# Patient Record
Sex: FEMALE | Race: BLACK OR AFRICAN AMERICAN | NOT HISPANIC OR LATINO | Employment: FULL TIME | URBAN - METROPOLITAN AREA
[De-identification: names, ages, dates, MRNs, and addresses within clinical notes are randomized per-mention and may not be internally consistent; named-entity substitution may affect disease eponyms.]

---

## 2017-05-20 ENCOUNTER — HOSPITAL ENCOUNTER (EMERGENCY)
Facility: HOSPITAL | Age: 22
Discharge: HOME | End: 2017-05-20
Attending: EMERGENCY MEDICINE
Payer: COMMERCIAL

## 2017-05-20 VITALS
HEART RATE: 93 BPM | RESPIRATION RATE: 25 BRPM | HEIGHT: 64 IN | BODY MASS INDEX: 40.97 KG/M2 | TEMPERATURE: 100 F | WEIGHT: 240 LBS | DIASTOLIC BLOOD PRESSURE: 75 MMHG | SYSTOLIC BLOOD PRESSURE: 145 MMHG | OXYGEN SATURATION: 100 %

## 2017-05-20 DIAGNOSIS — K52.9 GASTROENTERITIS: Primary | ICD-10-CM

## 2017-05-20 LAB
ALBUMIN SERPL BCP-MCNC: 4 G/DL
ALP SERPL-CCNC: 69 U/L
ALT SERPL W/O P-5'-P-CCNC: 11 U/L
ANION GAP SERPL CALC-SCNC: 11 MMOL/L
AST SERPL-CCNC: 14 U/L
BACTERIA #/AREA URNS HPF: NORMAL /HPF
BASOPHILS # BLD AUTO: 0.01 K/UL
BASOPHILS NFR BLD: 0.1 %
BILIRUB SERPL-MCNC: 1.9 MG/DL
BILIRUB UR QL STRIP: NEGATIVE
BUN SERPL-MCNC: 10 MG/DL
CALCIUM SERPL-MCNC: 9.6 MG/DL
CHLORIDE SERPL-SCNC: 109 MMOL/L
CLARITY UR: CLEAR
CO2 SERPL-SCNC: 19 MMOL/L
COLOR UR: YELLOW
CREAT SERPL-MCNC: 0.9 MG/DL
DIFFERENTIAL METHOD: ABNORMAL
EOSINOPHIL # BLD AUTO: 0 K/UL
EOSINOPHIL NFR BLD: 0.2 %
ERYTHROCYTE [DISTWIDTH] IN BLOOD BY AUTOMATED COUNT: 14.5 %
EST. GFR  (AFRICAN AMERICAN): >60 ML/MIN/1.73 M^2
EST. GFR  (NON AFRICAN AMERICAN): >60 ML/MIN/1.73 M^2
GLUCOSE SERPL-MCNC: 110 MG/DL
GLUCOSE UR QL STRIP: NEGATIVE
HCT VFR BLD AUTO: 41.5 %
HGB BLD-MCNC: 15.3 G/DL
HGB UR QL STRIP: ABNORMAL
KETONES UR QL STRIP: NEGATIVE
LEUKOCYTE ESTERASE UR QL STRIP: ABNORMAL
LYMPHOCYTES # BLD AUTO: 1 K/UL
LYMPHOCYTES NFR BLD: 8.4 %
MCH RBC QN AUTO: 29.3 PG
MCHC RBC AUTO-ENTMCNC: 36.9 %
MCV RBC AUTO: 79 FL
MICROSCOPIC COMMENT: NORMAL
MONOCYTES # BLD AUTO: 0.7 K/UL
MONOCYTES NFR BLD: 5.5 %
NEUTROPHILS # BLD AUTO: 10.4 K/UL
NEUTROPHILS NFR BLD: 86.2 %
NITRITE UR QL STRIP: NEGATIVE
PH UR STRIP: 6 [PH] (ref 5–8)
PLATELET # BLD AUTO: 230 K/UL
PMV BLD AUTO: 10.4 FL
POTASSIUM SERPL-SCNC: 3.9 MMOL/L
PROT SERPL-MCNC: 8.2 G/DL
PROT UR QL STRIP: NEGATIVE
RBC # BLD AUTO: 5.23 M/UL
RBC #/AREA URNS HPF: 3 /HPF (ref 0–4)
SODIUM SERPL-SCNC: 139 MMOL/L
SP GR UR STRIP: 1.01 (ref 1–1.03)
SQUAMOUS #/AREA URNS HPF: 2 /HPF
URN SPEC COLLECT METH UR: ABNORMAL
UROBILINOGEN UR STRIP-ACNC: ABNORMAL EU/DL
WBC # BLD AUTO: 12.12 K/UL
WBC #/AREA URNS HPF: 5 /HPF (ref 0–5)

## 2017-05-20 PROCEDURE — 25000003 PHARM REV CODE 250: Performed by: EMERGENCY MEDICINE

## 2017-05-20 PROCEDURE — 96361 HYDRATE IV INFUSION ADD-ON: CPT

## 2017-05-20 PROCEDURE — 80053 COMPREHEN METABOLIC PANEL: CPT

## 2017-05-20 PROCEDURE — 96374 THER/PROPH/DIAG INJ IV PUSH: CPT

## 2017-05-20 PROCEDURE — 85025 COMPLETE CBC W/AUTO DIFF WBC: CPT

## 2017-05-20 PROCEDURE — 99284 EMERGENCY DEPT VISIT MOD MDM: CPT | Mod: 25

## 2017-05-20 PROCEDURE — 63600175 PHARM REV CODE 636 W HCPCS: Performed by: EMERGENCY MEDICINE

## 2017-05-20 PROCEDURE — 81000 URINALYSIS NONAUTO W/SCOPE: CPT

## 2017-05-20 RX ORDER — ONDANSETRON 4 MG/1
4 TABLET, FILM COATED ORAL 3 TIMES DAILY PRN
Qty: 20 TABLET | Refills: 0 | Status: SHIPPED | OUTPATIENT
Start: 2017-05-20

## 2017-05-20 RX ORDER — PROMETHAZINE HYDROCHLORIDE 25 MG/1
25 TABLET ORAL EVERY 6 HOURS PRN
Qty: 20 TABLET | Refills: 0 | Status: SHIPPED | OUTPATIENT
Start: 2017-05-20

## 2017-05-20 RX ORDER — SODIUM CHLORIDE 9 MG/ML
125 INJECTION, SOLUTION INTRAVENOUS
Status: COMPLETED | OUTPATIENT
Start: 2017-05-20 | End: 2017-05-20

## 2017-05-20 RX ORDER — ONDANSETRON 2 MG/ML
4 INJECTION INTRAMUSCULAR; INTRAVENOUS
Status: COMPLETED | OUTPATIENT
Start: 2017-05-20 | End: 2017-05-20

## 2017-05-20 RX ADMIN — ONDANSETRON 4 MG: 2 INJECTION INTRAMUSCULAR; INTRAVENOUS at 06:05

## 2017-05-20 RX ADMIN — SODIUM CHLORIDE 1000 ML: 0.9 INJECTION, SOLUTION INTRAVENOUS at 06:05

## 2017-05-20 RX ADMIN — SODIUM CHLORIDE 125 ML: 0.9 INJECTION, SOLUTION INTRAVENOUS at 06:05

## 2017-05-20 NOTE — DISCHARGE INSTRUCTIONS

## 2017-05-20 NOTE — ED AVS SNAPSHOT
[facility] [address] [place] [address]               Jina Mills   2017  5:53 AM   ED    Description:  Female : 1995   Department:  [facility]           Your Care was Coordinated By:     Provider Role From To    Jack Yates MD Attending Provider 17 0601 --      Reason for Visit     Diarrhea     Nausea     Vomiting           Diagnoses this Visit        Comments    Gastroenteritis    -  Primary       ED Disposition     ED Disposition Condition Comment    Discharge             To Do List           Follow-up Information     Follow up with Primary Doctor No In 1 week(s).        Follow up with [facility].    Specialty:  Emergency Medicine    Why:  If symptoms worsen, Or worsening condition or any other major concern    Contact information:    [address] [place] [address] [phone]       These Medications        Disp Refills Start End    ondansetron (ZOFRAN) 4 MG tablet 20 tablet 0 2017     Take 1 tablet (4 mg total) by mouth 3 (three) times daily as needed for Nausea. - Oral    Pharmacy: [facility] [address] Ph #: [phone]       promethazine (PHENERGAN) 25 MG tablet 20 tablet 0 2017     Take 1 tablet (25 mg total) by mouth every 6 (six) hours as needed for Nausea. - Oral    Pharmacy: [facility] [address] Ph #: [phone]         [facility]     [facility] Care [facility] - 24/ Assistance  Unless otherwise directed by your provider, please contact [facility], our nurse care line that is available for 24/7 assistance.     Registered nurses in the [facility] provide: appointment scheduling, clinical advisement, health education, and other advisory services.  Call: [phone] (toll  free)               Medications           Message regarding Medications     Verify the changes and/or additions to your medication regime listed below are the same as discussed with your clinician today.  If any of these changes or additions are incorrect, please notify your healthcare provider.        START taking these NEW medications        Refills    ondansetron (ZOFRAN) 4 MG tablet 0    Sig: Take 1 tablet (4 mg total) by mouth 3 (three) times daily as needed for Nausea.    Class: Print    Route: Oral    promethazine (PHENERGAN) 25 MG tablet 0    Sig: Take 1 tablet (25 mg total) by mouth every 6 (six) hours as needed for Nausea.    Class: Print    Route: Oral      These medications were administered today        Dose Freq    0.9%  NaCl infusion 125 mL ED 1 Time    Sig: Inject 125 mLs into the vein ED 1 Time.    Class: Normal    Route: Intravenous    sodium chloride 0.9% bolus 1,000 mL 1,000 mL ED 1 Time    Sig: Inject 1,000 mLs into the vein ED 1 Time.    Class: Normal    Route: Intravenous    ondansetron injection 4 mg 4 mg ED 1 Time    Sig: Inject 4 mg into the vein ED 1 Time.    Class: Normal    Route: Intravenous    sodium chloride 0.9% bolus 1,000 mL 1,000 mL ED 1 Time    Sig: Inject 1,000 mLs into the vein ED 1 Time.    Class: Normal    Route: Intravenous           Verify that the below list of medications is an accurate representation of the medications you are currently taking.  If none reported, the list may be blank. If incorrect, please contact your healthcare provider. Carry this list with you in case of emergency.           Current Medications     ondansetron (ZOFRAN) 4 MG tablet Take 1 tablet (4 mg total) by mouth 3 (three) times daily as needed for Nausea.    promethazine (PHENERGAN) 25 MG tablet Take 1 tablet (25 mg total) by mouth every 6 (six) hours as needed for Nausea.    sodium chloride 0.9% bolus 1,000 mL Inject 1,000 mLs into the vein ED 1 Time.           Clinical Reference Information     "       Your Vitals Were     BP Pulse Temp Resp Height Weight    121/47 95 100.1 °F (37.8 °C) (Oral) 21 5' 4" (1.626 m) 108.9 kg (240 lb)    SpO2 BMI             100% 41.2 kg/m2         Allergies as of 5/20/2017        Reactions    Iodine And Iodide Containing Products Anaphylaxis      Immunizations Administered on Date of Encounter - 5/20/2017     None      ED Micro, Lab, POCT     Start Ordered       Status Ordering Provider    05/20/17 0609 05/20/17 0609  CBC auto differential  Once      Final result     05/20/17 0609 05/20/17 0609  Comprehensive metabolic panel  STAT      Final result     05/20/17 0609 05/20/17 0609  Urinalysis - Clean Catch  Once      Final result     05/20/17 0609 05/20/17 0609  Urinalysis Microscopic  Once      Final result       ED Imaging Orders     None        Discharge Instructions           Viral Gastroenteritis (Adult)    Gastroenteritis is commonly called the stomach flu. It is most often caused by a virus that affects the stomach and intestinal tract and usually lasts from 2 to 7 days. Common viruses causing gastroenteritis include norovirus, rotavirus, and hepatitis A. Non-viral causes of gastroenteritis include bacteria, parasites, and toxins.  The danger from repeated vomiting or diarrhea is dehydration. This is the loss of too much fluid from the body. When this occurs, body fluids must be replaced. Antibiotics do not help with this illness because it is usually viral.Simple home treatment will be helpful.  Symptoms of viral gastroenteritis may include:  · Watery, loose stools  · Stomach pain or abdominal cramps  · Fever and chills  · Nausea and vomiting  · Loss of bowel control  · Headache  Home care  Gastroenteritis is transmitted by contact with the stool or vomit of an infected person. This can occur from person to person or from contact with a contaminated surface.  Follow these guidelines when caring for yourself at home:  · If symptoms are severe, rest at home for the next 24 " hours or until you are feeling better.  · Wash your hands with soap and water or use alcohol-based  to prevent the spread of infection. Wash your hands after touching anyone who is sick.  · Wash your hands or use alcohol-based  after using the toilet and before meals. Clean the toilet after each use.  Remember these tips when preparing food:  · People with diarrhea should not prepare or serve food to others. When preparing foods, wash your hands before and after.  · Wash your hands after using cutting boards, countertops, knives, or utensils that have been in contact with raw food.  · Keep uncooked meats away from cooked and ready-to-eat foods.  Medicine  You may use acetaminophen or NSAID medicines like ibuprofen or naproxen to control fever unless another medicine was given. If you have chronic liver or kidney disease, talk with your healthcare provider before using these medicines. Also talk with your provider if you've had a stomach ulcer or gastrointestinal bleeding. Don't give aspirin to anyone under 18 years of age who is ill with a fever. It may cause severe liver damage. Don't use NSAIDS is you are already taking one for another condition (like arthritis) or are on aspirin (such as for heart disease or after a stroke).  If medicine for vomiting or diarrhea are prescribed, take these only as directed. Do not take over-the-counter medicines for vomiting or diarrhea unless instructed by your healthcare provider.  Diet  Follow these guidelines for food:  · Water and liquids are important so you don't get dehydrated. Drink a small amount at a time or suck on ice chips if you are vomiting.  · If you eat, avoid fatty, greasy, spicy, or fried foods.  · Don't eat dairy if you have diarrhea. This can make diarrhea worse.  · Avoid tobacco, alcohol, and caffeine which may worsen symptoms.  During the first 24 hours (the first full day), follow the diet below:  · Beverages. Sports drinks, soft drinks  without caffeine, ginger ale, mineral water (plain or flavored), decaffeinated tea and coffee. If you are very dehydrated, sports drinks aren't a good choice. They have too much sugar and not enough electrolytes. In this case, commercially available products called oral rehydration solutions, are best.  · Soups. Eat clear broth, consommé, and bouillon.  · Desserts. Eat gelatin, popsicles, and fruit juice bars.  During the next 24 hours (the second day), you may add the following to the above:  · Hot cereal, plain toast, bread, rolls, and crackers  · Plain noodles, rice, mashed potatoes, chicken noodle or rice soup  · Unsweetened canned fruit (avoid pineapple), bananas  · Limit fat intake to less than 15 grams per day. Do this by avoiding margarine, butter, oils, mayonnaise, sauces, gravies, fried foods, peanut butter, meat, poultry, and fish.  · Limit fiber and avoid raw or cooked vegetables, fresh fruits (except bananas), and bran cereals.  · Limit caffeine and chocolate. Don't use spices or seasonings other than salt.  · Limit dairy products.  · Avoid alcohol.  During the next 24 hours:  · Gradually resume a normal diet as you feel better and your symptoms improve.  · If at any time it starts getting worse again, go back to clear liquids until you feel better.  Follow-up care  Follow up with your healthcare provider, or as advised. Call your provider if you don't get better within 24 hours or if diarrhea lasts more than a week. Also follow up if you are unable to keep down liquids and get dehydrated. If a stool (diarrhea) sample was taken, call as directed for the results.  Call 911  Call 911 if any of these occur:  · Trouble breathing  · Chest pain  · Confused  · Severe drowsiness or trouble awakening  · Fainting or loss of consciousness  · Rapid heart rate  · Seizure  · Stiff neck  When to seek medical advice  Call your healthcare provider right away if any of these occur:  · Abdominal pain that gets  worse  · Continued vomiting (unable to keep liquids down)  · Frequent diarrhea (more than 5 times a day)  · Blood in vomit or stool (black or red color)  · Dark urine, reduced urine output, or extreme thirst  · Weakness or dizziness  · Drowsiness  · Fever of 100.4°F (38°C) oral or higher that does not get better with fever medicine  · New rash  Date Last Reviewed: 1/3/2016  © 2520-9164 [employer] [address] [place] [address], [place] [address]. All rights reserved. This information is not intended as a substitute for professional medical care. Always follow your healthcare professional's instructions.          [facility] Sign-Up     Activating your [facility] account is as easy as 1-2-3!     1) Visit [email or web address], select Sign Up Now, enter this activation code and your date of birth, then select Next.  [email or web address]  Expires: 7/4/2017  8:26 AM      2) Create a username and password to use when you visit [facility] in the future and select a security question in case you lose your password and select Next.    3) Enter your e-mail address and click Sign Up!    Additional Information  If you have questions, please e-mail [email or web address] or call [phone] to talk to our [facility] staff. Remember, [facility] is NOT to be used for urgent needs. For medical emergencies, dial 911.         Smoking Cessation     If you would like to quit smoking:   You may be eligible for free services if you are a [place] resident and started smoking cigarettes before September 1, 1988.  Call the Smoking Cessation Trust (SCT) toll free at (106) 287-1292 or (544) 380-5486.   Call -800-QUIT-NOW if you do not meet the above criteria.   Contact us via email: [email or web address]   View our website for more information: [email or web address]         [facility] complies with applicable Federal civil rights laws and does not discriminate on the basis of race, color, national origin, age,  disability, or sex.        Language Assistance Services     ATTENTION: Language assistance services are available, free of charge. Please call [phone].      ATENCIÓN: Si habla eusebiaañol, tiene a donahue disposición servicios gratuitos de asistencia lingüística. Llame al [phone].     CHÚ Ý: N?u b?n nói Ti?ng Vi?t, có các d?ch v? h? tr? ngôn ng? mi?n phí dành cho b?n. G?i s? [phone].

## 2017-05-20 NOTE — ED PROVIDER NOTES
SCRIBE #1 NOTE: I, Man Jean Baptiste (Khaiden), am scribing for, and in the presence of, Jack Yates MD. I have scribed the entire note.      History      Chief Complaint   Patient presents with    Diarrhea     onset 3 days ago    Nausea    Vomiting       Review of patient's allergies indicates:   Allergen Reactions    Iodine and iodide containing products Anaphylaxis        HPI   HPI    5/20/2017, 6:07 AM   History obtained from the patient      History of Present Illness: Jina Mills is a 22 y.o. female patient who presents to the Emergency Department for abd pain which onset gradually yesterday. Symptoms are episodic and moderate in severity. No mitigating or exacerbating factors reported. Associated sxs include n/v/d, fever, chills, generalized weakness. Patient denies any hematemesis, hematochezia, dysuria, urinary frequency, constipation, and all other sxs at this time. No prior Tx reported. No further complaints or concerns at this time.         Arrival mode: Personal    PCP: Primary Doctor No       Past Medical History:  Past Medical Hx Reviewed not pertinent.     Past Surgical History:  Past Surgical Hx Reviewed not pertinent.    Family History:  Family History   Problem Relation Age of Onset    No Known Problems Mother     No Known Problems Father     No Known Problems Sister     No Known Problems Brother     No Known Problems Maternal Aunt     No Known Problems Maternal Uncle     No Known Problems Paternal Aunt     No Known Problems Paternal Uncle     No Known Problems Maternal Grandmother     No Known Problems Maternal Grandfather     No Known Problems Paternal Grandmother     No Known Problems Paternal Grandfather     Amblyopia Neg Hx     Blindness Neg Hx     Cancer Neg Hx     Cataracts Neg Hx     Diabetes Neg Hx     Glaucoma Neg Hx     Hypertension Neg Hx     Macular degeneration Neg Hx     Retinal detachment Neg Hx     Strabismus Neg Hx     Stroke Neg Hx      Thyroid disease Neg Hx        Social History:  Social History     Social History Main Topics    Smoking status: Current Every Day Smoker     Types: Cigarettes    Smokeless tobacco: Unknown    Alcohol use No    Drug use: No    Sexual activity: Unknown       ROS   Review of Systems   Constitutional: Positive for chills and fever (Subjective).        (+) Generalized weakness   HENT: Negative for sore throat.    Respiratory: Negative for shortness of breath.    Cardiovascular: Negative for chest pain.   Gastrointestinal: Positive for abdominal pain, diarrhea (x2) and nausea. Negative for blood in stool. Vomiting: x4.        (-) hematemesis   Genitourinary: Negative for dysuria, vaginal bleeding, vaginal discharge and vaginal pain.   Musculoskeletal: Negative for back pain.   Skin: Negative for rash.   Neurological: Negative for weakness.   Hematological: Does not bruise/bleed easily.   All other systems reviewed and are negative.      Physical Exam    Initial Vitals   BP Pulse Resp Temp SpO2   05/20/17 0551 05/20/17 0551 05/20/17 0551 05/20/17 0551 05/20/17 0551   148/82 100 16 100.1 °F (37.8 °C) 96 %      Physical Exam  Nursing Notes and Vital Signs Reviewed.  Constitutional: Patient is in no acute distress. Well-developed and well-nourished.  Head: Atraumatic. Normocephalic.  Eyes: PERRL. EOM intact. Conjunctivae are not pale. No scleral icterus.  ENT: Mucous membranes are moist. Oropharynx is clear and symmetric.    Neck: Supple. Full ROM. No lymphadenopathy.  Cardiovascular: Regular rate. Regular rhythm. No murmurs, rubs, or gallops. Distal pulses are 2+ and symmetric.  Pulmonary/Chest: No respiratory distress. Clear to auscultation bilaterally. No wheezing, rales, or rhonchi.  Abdominal: Soft and non-distended.  There is no tenderness.  No rebound, guarding, or rigidity. Good bowel sounds.  Musculoskeletal: Moves all extremities. No obvious deformities. No edema. No calf tenderness.  Skin: Warm and  "dry.  Neurological:  Alert, awake, and appropriate.  Normal speech.  No acute focal neurological deficits are appreciated.  Psychiatric: Normal affect. Good eye contact. Appropriate in content.    ED Course    Procedures  ED Vital Signs:  Vitals:    05/20/17 0551 05/20/17 0650 05/20/17 0716 05/20/17 0842   BP: (!) 148/82 (!) 152/70 (!) 121/47    Pulse: 100 96 95 93   Resp: 16 (!) 23 (!) 21 (!) 25   Temp: 100.1 °F (37.8 °C)      TempSrc: Oral      SpO2: 96% 100% 100% 100%   Weight: 108.9 kg (240 lb)      Height: 5' 4" (1.626 m)       05/20/17 0853   BP: (!) 145/75   Pulse:    Resp:    Temp:    TempSrc:    SpO2:    Weight:    Height:        Abnormal Lab Results:  Labs Reviewed   CBC W/ AUTO DIFFERENTIAL - Abnormal; Notable for the following:        Result Value    MCV 79 (*)     MCHC 36.9 (*)     Gran # 10.4 (*)     Gran% 86.2 (*)     Lymph% 8.4 (*)     All other components within normal limits   COMPREHENSIVE METABOLIC PANEL - Abnormal; Notable for the following:     CO2 19 (*)     Total Bilirubin 1.9 (*)     All other components within normal limits   URINALYSIS - Abnormal; Notable for the following:     Occult Blood UA Trace (*)     Urobilinogen, UA 2.0-3.0 (*)     Leukocytes, UA 2+ (*)     All other components within normal limits   URINALYSIS MICROSCOPIC        All Lab Results:  Results for orders placed or performed during the hospital encounter of 05/20/17   CBC auto differential   Result Value Ref Range    WBC 12.12 3.90 - 12.70 K/uL    RBC 5.23 4.00 - 5.40 M/uL    Hemoglobin 15.3 12.0 - 16.0 g/dL    Hematocrit 41.5 37.0 - 48.5 %    MCV 79 (L) 82 - 98 fL    MCH 29.3 27.0 - 31.0 pg    MCHC 36.9 (H) 32.0 - 36.0 %    RDW 14.5 11.5 - 14.5 %    Platelets 230 150 - 350 K/uL    MPV 10.4 9.2 - 12.9 fL    Gran # 10.4 (H) 1.8 - 7.7 K/uL    Lymph # 1.0 1.0 - 4.8 K/uL    Mono # 0.7 0.3 - 1.0 K/uL    Eos # 0.0 0.0 - 0.5 K/uL    Baso # 0.01 0.00 - 0.20 K/uL    Gran% 86.2 (H) 38.0 - 73.0 %    Lymph% 8.4 (L) 18.0 - 48.0 %    " Mono% 5.5 4.0 - 15.0 %    Eosinophil% 0.2 0.0 - 8.0 %    Basophil% 0.1 0.0 - 1.9 %    Differential Method Automated    Comprehensive metabolic panel   Result Value Ref Range    Sodium 139 136 - 145 mmol/L    Potassium 3.9 3.5 - 5.1 mmol/L    Chloride 109 95 - 110 mmol/L    CO2 19 (L) 23 - 29 mmol/L    Glucose 110 70 - 110 mg/dL    BUN, Bld 10 6 - 20 mg/dL    Creatinine 0.9 0.5 - 1.4 mg/dL    Calcium 9.6 8.7 - 10.5 mg/dL    Total Protein 8.2 6.0 - 8.4 g/dL    Albumin 4.0 3.5 - 5.2 g/dL    Total Bilirubin 1.9 (H) 0.1 - 1.0 mg/dL    Alkaline Phosphatase 69 55 - 135 U/L    AST 14 10 - 40 U/L    ALT 11 10 - 44 U/L    Anion Gap 11 8 - 16 mmol/L    eGFR if African American >60 >60 mL/min/1.73 m^2    eGFR if non African American >60 >60 mL/min/1.73 m^2   Urinalysis - Clean Catch   Result Value Ref Range    Specimen UA Urine, Clean Catch     Color, UA Yellow Yellow, Straw, Aspen    Appearance, UA Clear Clear    pH, UA 6.0 5.0 - 8.0    Specific Gravity, UA 1.015 1.005 - 1.030    Protein, UA Negative Negative    Glucose, UA Negative Negative    Ketones, UA Negative Negative    Bilirubin (UA) Negative Negative    Occult Blood UA Trace (A) Negative    Nitrite, UA Negative Negative    Urobilinogen, UA 2.0-3.0 (A) <2.0 EU/dL    Leukocytes, UA 2+ (A) Negative   Urinalysis Microscopic   Result Value Ref Range    RBC, UA 3 0 - 4 /hpf    WBC, UA 5 0 - 5 /hpf    Bacteria, UA Occasional None-Occ /hpf    Squam Epithel, UA 2 /hpf    Microscopic Comment SEE COMMENT               The Emergency Provider reviewed the vital signs and test results, which are outlined above.    ED Discussion     7:22 AM: Re-evaluated pt. Pt is resting comfortably and is in no acute distress. D/w pt all pertinent results. D/w pt any concerns expressed at this time. Answered all questions. Pt expresses understanding at this time.    8:26 AM: Reassessed pt at this time.  Pt states her condition has improved at this time. Discussed with pt all pertinent ED  information and results. Discussed pt dx of  and plan of tx. Gave pt all f/u and return to the ED instructions. All questions and concerns were addressed at this time. Pt expresses understanding of information and instructions, and is comfortable with plan to discharge. Pt is stable for discharge.      ED Medication(s):  Medications   0.9%  NaCl infusion (0 mLs Intravenous Stopped 5/20/17 0649)   sodium chloride 0.9% bolus 1,000 mL (0 mLs Intravenous Stopped 5/20/17 0851)   ondansetron injection 4 mg (4 mg Intravenous Given 5/20/17 0625)       Discharge Medication List as of 5/20/2017  8:27 AM      START taking these medications    Details   ondansetron (ZOFRAN) 4 MG tablet Take 1 tablet (4 mg total) by mouth 3 (three) times daily as needed for Nausea., Starting 5/20/2017, Until Discontinued, Print      promethazine (PHENERGAN) 25 MG tablet Take 1 tablet (25 mg total) by mouth every 6 (six) hours as needed for Nausea., Starting 5/20/2017, Until Discontinued, Print             Follow-up Information     Primary Doctor No In 1 week(s).           [facility].    Specialty:  Emergency Medicine  Why:  If symptoms worsen, Or worsening condition or any other major concern  Contact information:  [address] [facility] [address] [phone]                   Medical Decision Making    Medical Decision Making:   Clinical Tests:   Lab Tests: Ordered and Reviewed           Scribe Attestation:   Scribe #1: I performed the above scribed service and the documentation accurately describes the services I performed. I attest to the accuracy of the note.    Attending:   Physician Attestation Statement for Scribe #1: I, Jack Yates MD, personally performed the services described in this documentation, as scribed by Man Jean Baptiste (Khaiden), in my presence, and it is both accurate and complete.          Clinical Impression       ICD-10-CM ICD-9-CM   1. Gastroenteritis K52.9 558.9        Disposition:   Disposition: Discharged  Condition: Stable         Jack Yates MD  05/26/17 2032

## 2019-04-22 ENCOUNTER — HOSPITAL ENCOUNTER (EMERGENCY)
Facility: HOSPITAL | Age: 24
Discharge: HOME | End: 2019-04-23
Attending: EMERGENCY MEDICINE
Payer: MEDICAID

## 2019-04-22 DIAGNOSIS — N83.202 CYSTS OF BOTH OVARIES: ICD-10-CM

## 2019-04-22 DIAGNOSIS — R10.31 ACUTE RIGHT LOWER QUADRANT PAIN: Primary | ICD-10-CM

## 2019-04-22 DIAGNOSIS — N83.201 CYSTS OF BOTH OVARIES: ICD-10-CM

## 2019-04-22 DIAGNOSIS — N83.8 ENLARGED OVARY: ICD-10-CM

## 2019-04-22 LAB
ALBUMIN SERPL BCP-MCNC: 4 G/DL (ref 3.5–5.2)
ALP SERPL-CCNC: 68 U/L (ref 55–135)
ALT SERPL W/O P-5'-P-CCNC: 14 U/L (ref 10–44)
ANION GAP SERPL CALC-SCNC: 8 MMOL/L (ref 8–16)
AST SERPL-CCNC: 15 U/L (ref 10–40)
B-HCG UR QL: NEGATIVE
BACTERIA #/AREA URNS HPF: ABNORMAL /HPF
BASOPHILS # BLD AUTO: 0.02 K/UL (ref 0–0.2)
BASOPHILS NFR BLD: 0.2 % (ref 0–1.9)
BILIRUB SERPL-MCNC: 0.7 MG/DL (ref 0.1–1)
BILIRUB UR QL STRIP: NEGATIVE
BUN SERPL-MCNC: 10 MG/DL (ref 6–20)
CALCIUM SERPL-MCNC: 9.6 MG/DL (ref 8.7–10.5)
CHLORIDE SERPL-SCNC: 107 MMOL/L (ref 95–110)
CLARITY UR: CLEAR
CO2 SERPL-SCNC: 22 MMOL/L (ref 23–29)
COLOR UR: YELLOW
CREAT SERPL-MCNC: 0.9 MG/DL (ref 0.5–1.4)
DIFFERENTIAL METHOD: ABNORMAL
EOSINOPHIL # BLD AUTO: 0.1 K/UL (ref 0–0.5)
EOSINOPHIL NFR BLD: 0.5 % (ref 0–8)
ERYTHROCYTE [DISTWIDTH] IN BLOOD BY AUTOMATED COUNT: 14.7 % (ref 11.5–14.5)
EST. GFR  (AFRICAN AMERICAN): >60 ML/MIN/1.73 M^2
EST. GFR  (NON AFRICAN AMERICAN): >60 ML/MIN/1.73 M^2
GLUCOSE SERPL-MCNC: 95 MG/DL (ref 70–110)
GLUCOSE UR QL STRIP: NEGATIVE
HCT VFR BLD AUTO: 39 % (ref 37–48.5)
HGB BLD-MCNC: 14.1 G/DL (ref 12–16)
HGB UR QL STRIP: NEGATIVE
KETONES UR QL STRIP: NEGATIVE
LEUKOCYTE ESTERASE UR QL STRIP: ABNORMAL
LIPASE SERPL-CCNC: 25 U/L (ref 4–60)
LYMPHOCYTES # BLD AUTO: 2.8 K/UL (ref 1–4.8)
LYMPHOCYTES NFR BLD: 24.6 % (ref 18–48)
MCH RBC QN AUTO: 29.6 PG (ref 27–31)
MCHC RBC AUTO-ENTMCNC: 36.2 G/DL (ref 32–36)
MCV RBC AUTO: 82 FL (ref 82–98)
MICROSCOPIC COMMENT: ABNORMAL
MONOCYTES # BLD AUTO: 0.4 K/UL (ref 0.3–1)
MONOCYTES NFR BLD: 3.4 % (ref 4–15)
NEUTROPHILS # BLD AUTO: 8 K/UL (ref 1.8–7.7)
NEUTROPHILS NFR BLD: 71.3 % (ref 38–73)
NITRITE UR QL STRIP: NEGATIVE
PH UR STRIP: 7 [PH] (ref 5–8)
PLATELET # BLD AUTO: 288 K/UL (ref 150–350)
PMV BLD AUTO: 9.8 FL (ref 9.2–12.9)
POTASSIUM SERPL-SCNC: 4.1 MMOL/L (ref 3.5–5.1)
PROT SERPL-MCNC: 7.7 G/DL (ref 6–8.4)
PROT UR QL STRIP: NEGATIVE
RBC # BLD AUTO: 4.77 M/UL (ref 4–5.4)
RBC #/AREA URNS HPF: 5 /HPF (ref 0–4)
SODIUM SERPL-SCNC: 137 MMOL/L (ref 136–145)
SP GR UR STRIP: 1.02 (ref 1–1.03)
URN SPEC COLLECT METH UR: ABNORMAL
UROBILINOGEN UR STRIP-ACNC: NEGATIVE EU/DL
WBC # BLD AUTO: 11.22 K/UL (ref 3.9–12.7)
WBC #/AREA URNS HPF: 8 /HPF (ref 0–5)

## 2019-04-22 PROCEDURE — 81025 URINE PREGNANCY TEST: CPT

## 2019-04-22 PROCEDURE — 81000 URINALYSIS NONAUTO W/SCOPE: CPT

## 2019-04-22 PROCEDURE — 51702 INSERT TEMP BLADDER CATH: CPT

## 2019-04-22 PROCEDURE — 25000003 PHARM REV CODE 250: Performed by: NURSE PRACTITIONER

## 2019-04-22 PROCEDURE — 99284 EMERGENCY DEPT VISIT MOD MDM: CPT

## 2019-04-22 PROCEDURE — 80053 COMPREHEN METABOLIC PANEL: CPT

## 2019-04-22 PROCEDURE — 83690 ASSAY OF LIPASE: CPT

## 2019-04-22 PROCEDURE — 85025 COMPLETE CBC W/AUTO DIFF WBC: CPT

## 2019-04-22 RX ORDER — ONDANSETRON 8 MG/1
8 TABLET, ORALLY DISINTEGRATING ORAL
Status: COMPLETED | OUTPATIENT
Start: 2019-04-22 | End: 2019-04-22

## 2019-04-22 RX ORDER — HYDROCODONE BITARTRATE AND ACETAMINOPHEN 5; 325 MG/1; MG/1
1 TABLET ORAL EVERY 8 HOURS PRN
Qty: 12 TABLET | Refills: 0 | Status: SHIPPED | OUTPATIENT
Start: 2019-04-22

## 2019-04-22 RX ORDER — ONDANSETRON 4 MG/1
4 TABLET, ORALLY DISINTEGRATING ORAL EVERY 8 HOURS PRN
Qty: 15 TABLET | Refills: 0 | Status: SHIPPED | OUTPATIENT
Start: 2019-04-22

## 2019-04-22 RX ORDER — HYDROCODONE BITARTRATE AND ACETAMINOPHEN 10; 325 MG/1; MG/1
1 TABLET ORAL
Status: COMPLETED | OUTPATIENT
Start: 2019-04-22 | End: 2019-04-22

## 2019-04-22 RX ADMIN — HYDROCODONE BITARTRATE AND ACETAMINOPHEN 1 TABLET: 10; 325 TABLET ORAL at 11:04

## 2019-04-22 RX ADMIN — ONDANSETRON 8 MG: 8 TABLET, ORALLY DISINTEGRATING ORAL at 08:04

## 2019-04-23 VITALS
RESPIRATION RATE: 18 BRPM | HEIGHT: 64 IN | DIASTOLIC BLOOD PRESSURE: 105 MMHG | BODY MASS INDEX: 40.83 KG/M2 | HEART RATE: 69 BPM | TEMPERATURE: 98 F | SYSTOLIC BLOOD PRESSURE: 152 MMHG | WEIGHT: 239.19 LBS | OXYGEN SATURATION: 97 %

## 2019-04-23 NOTE — ED NOTES
Bed: 21  Expected date:   Expected time:   Means of arrival:   Comments:  Mills In Treatment desean Garrett NP  04/22/19 0962

## 2019-04-23 NOTE — ED PROVIDER NOTES
SCRIBE #1 NOTE: I, Josselyn Chisholm, am scribing for, and in the presence of, Tyler Garrett NP. I have scribed the entire note.       History     Chief Complaint   Patient presents with    Vomiting     LMP 4/1/19, pt reports 1 day cycle. Pt states generalized abd cramping. Not tender to palpation. LBM 4/21/19     Review of patient's allergies indicates:   Allergen Reactions    Iodine and iodide containing products Anaphylaxis         History of Present Illness     HPI    4/22/2019, 8:13 PM  History obtained from the patient      History of Present Illness: Jina Mills is a 24 y.o. female patient who presents to the Emergency Department for evaluation of emesis which onset gradually x2 days ago. Pt states she cannot keep anything down, not even her medication. Symptoms are episodic and moderate in severity. No mitigating or exacerbating factors reported. Associated sxs include nausea and R sided abd pain. Patient denies any fever, chills, numbness, frequency, weakness, dysuria, hematuria, blood in stool, diaphoresis, and all other sxs at this time. Prior Tx includes tylenol and muscle relaxer with no relief. Pt notes her LNMP was 4/1/19. It was a one day cycle. No further complaints or concerns at this time.         Arrival mode: Personal vehicle     PCP: Primary Doctor No        Past Medical History:  History reviewed. No pertinent past medical history.    Past Surgical History:  History reviewed. No pertinent surgical history.      Family History:  Family History   Problem Relation Age of Onset    No Known Problems Mother     No Known Problems Father     No Known Problems Sister     No Known Problems Brother     No Known Problems Maternal Aunt     No Known Problems Maternal Uncle     No Known Problems Paternal Aunt     No Known Problems Paternal Uncle     No Known Problems Maternal Grandmother     No Known Problems Maternal Grandfather     No Known Problems Paternal Grandmother     No Known  Problems Paternal Grandfather     Amblyopia Neg Hx     Blindness Neg Hx     Cancer Neg Hx     Cataracts Neg Hx     Diabetes Neg Hx     Glaucoma Neg Hx     Hypertension Neg Hx     Macular degeneration Neg Hx     Retinal detachment Neg Hx     Strabismus Neg Hx     Stroke Neg Hx     Thyroid disease Neg Hx        Social History:  Social History     Tobacco Use    Smoking status: Current Every Day Smoker     Types: Cigarettes   Substance and Sexual Activity    Alcohol use: No    Drug use: No    Sexual activity: Not on file        Review of Systems     Review of Systems   Constitutional: Negative for chills, diaphoresis and fever.   HENT: Negative for sore throat.    Respiratory: Negative for shortness of breath.    Cardiovascular: Negative for chest pain.   Gastrointestinal: Positive for abdominal pain (R sided), nausea and vomiting. Negative for blood in stool.   Genitourinary: Negative for dysuria, frequency and hematuria.   Musculoskeletal: Negative for back pain.   Skin: Negative for rash.   Neurological: Negative for weakness and numbness.   Hematological: Does not bruise/bleed easily.   All other systems reviewed and are negative.       Physical Exam     Initial Vitals [04/22/19 1909]   BP Pulse Resp Temp SpO2   (!) 140/82 79 18 98.6 °F (37 °C) 96 %      MAP       --          Physical Exam  Nursing Notes and Vital Signs Reviewed.  Constitutional: Patient is in no acute distress. Well-developed and well-nourished.  Head: Atraumatic. Normocephalic.  Eyes: PERRL. EOM intact. Conjunctivae are not pale. No scleral icterus.  ENT: Mucous membranes are moist. Oropharynx is clear and symmetric.    Neck: Supple. Full ROM. No lymphadenopathy.  Cardiovascular: Regular rate. Regular rhythm. No murmurs, rubs, or gallops. Distal pulses are 2+ and symmetric.  Pulmonary/Chest: No respiratory distress. Clear to auscultation bilaterally. No wheezing or rales.  Abdominal:  Soft and non-distended.  There is  No  "tenderness.  No rebound, guarding, or rigidity.  No organomegaly. No pulsatile mass. Normal bowel sounds.  Genitourinary: No CVA tenderness  Musculoskeletal: Moves all extremities. No obvious deformities. No edema. No calf tenderness.  Skin: Warm and dry.  Neurological:  Alert, awake, and appropriate.  Normal speech.  No acute focal neurological deficits are appreciated.  Psychiatric: Normal affect. Good eye contact. Appropriate in content.     ED Course   Procedures  ED Vital Signs:  Vitals:    04/22/19 1909 04/23/19 0001   BP: (!) 140/82 (!) 152/105   Pulse: 79 69   Resp: 18    Temp: 98.6 °F (37 °C) 98.2 °F (36.8 °C)   TempSrc: Oral Oral   SpO2: 96% 97%   Weight: 108.5 kg (239 lb 3.2 oz)    Height: 5' 4" (1.626 m)        Abnormal Lab Results:  Labs Reviewed   URINALYSIS, REFLEX TO URINE CULTURE - Abnormal; Notable for the following components:       Result Value    Leukocytes, UA Trace (*)     All other components within normal limits    Narrative:     Preferred Collection Type->Urine, Clean Catch   CBC W/ AUTO DIFFERENTIAL - Abnormal; Notable for the following components:    MCHC 36.2 (*)     RDW 14.7 (*)     Gran # (ANC) 8.0 (*)     Mono% 3.4 (*)     All other components within normal limits   COMPREHENSIVE METABOLIC PANEL - Abnormal; Notable for the following components:    CO2 22 (*)     All other components within normal limits   URINALYSIS MICROSCOPIC - Abnormal; Notable for the following components:    RBC, UA 5 (*)     WBC, UA 8 (*)     All other components within normal limits    Narrative:     Preferred Collection Type->Urine, Clean Catch   PREGNANCY TEST, URINE RAPID   LIPASE        All Lab Results:      Imaging Results:  Imaging Results          US Pelvis Comp with Transvag NON-OB (xpd) (Final result)  Result time 04/22/19 23:18:28   Procedure changed from US Pelvis Complete Non OB     Final result by Michael Han III, MD (04/22/19 23:18:28)                 Impression:      1.  Uterus is " unremarkable.  Incidental note of nabothian cysts.    2.  Left ovary shows evidence of a 1.5 cm cyst but is otherwise unremarkable.    3.  Multicystic right ovary with multiple hemorrhagic cyst suggested, the largest measuring 4.5 cm in diameter.  The ovary is enlarged measuring up to 9 cm in length.      Electronically signed by: Michael Han MD  Date:    04/22/2019  Time:    23:18             Narrative:    EXAMINATION:  US PELVIS COMP WITH TRANSVAG NON-OB (XPD)    CLINICAL HISTORY:  right pelvic pain;    FINDINGS  Uterus measures 6.6 x 3 x 5.3 cm in diameter.  There is a multi cystic ovary on the right including in some internal echoes within the cysts.  The ovary itself is large measuring 9 x 4 x 7.3 cm in dimension.  The largest cyst measures 4.5 cm in diameter.    Doppler analysis shows flow bilaterally without evidence of torsion.  Left ovary shows evidence of a 1.5 cm cyst and measures 2.6 cm in overall diameter.                               CT Abdomen Pelvis  Without Contrast (Final result)  Result time 04/22/19 21:31:08   Procedure changed from CT Abdomen Pelvis With Contrast     Final result by Michael Han III, MD (04/22/19 21:31:08)                 Impression:      1.  No bowel obstruction or free air.    2.  No definite evidence of acute appendicitis although the scan is limited without contrast.    3.  Suspect adnexal densities primarily on the right which at least in part are cystic.  Cannot exclude hydrosalpinx as well.  If indicated, consider further correlation with ultrasound.  Repeat CT with oral and IV contrast could add additional information if indicated.    All CT scans at this facility are performed  using dose modulation techniques as appropriate to performed exam including the following:  automated exposure control; adjustment of mA and/or kV according to the patients size (this includes techniques or standardized protocols for targeted exams where dose is matched to  indication/reason for exam: i.e. extremities or head);  iterative reconstruction technique.      Electronically signed by: Michael Han MD  Date:    04/22/2019  Time:    21:31             Narrative:    EXAMINATION:  CT ABDOMEN PELVIS WITHOUT CONTRAST    CLINICAL HISTORY:  RLQ pain, appendicitis suspected;    TECHNIQUE:  Axial CT images performed through the abdomen and pelvis without intravenous contrast. Multiplanar reformats were performed and interpreted.    COMPARISON:  None    FINDINGS:  Heart size is normal.  Lung bases are grossly clear.  There is no free intraperitoneal air or bowel obstruction.  Bony windows show no acute abnormality.    The liver and spleen are unremarkable.  Gallbladder is of normal CT appearance.  There is no adrenal or pancreatic mass or enlargement.  There is no solid renal mass or obstruction.  No ascites or adenopathy.    Although the study is limited without the use of IV and oral contrast, there is no definite abnormality in the right lower quadrant that would suggest acute appendicitis.    In the pelvis however there are areas of mixed attenuation with bladder probably ovarian cysts, primarily on the right.  Hydrosalpinx is not excluded.  Consider further correlation with ultrasound as indicated.  CT could be better delineated with oral and IV contrast if indicated.                                          The Emergency Provider reviewed the vital signs and test results, which are outlined above.     ED Discussion   Patient is tolerating PO challenge        Reevaluation: The patient feels better and is resting comfortably. Pt states symptoms are improved. Discussed test results, shared treatment plan, specific conditions for return, and the importance of follow up. Pt feels comfortable with the plan as discussed. Answered questions at this time. Patient has remained hemodynamically stable throughout ED course and is stable for discharge.       ED Medication(s):  Medications    ondansetron disintegrating tablet 8 mg (8 mg Oral Given 4/22/19 2035)   HYDROcodone-acetaminophen  mg per tablet 1 tablet (1 tablet Oral Given 4/22/19 8290)       Discharge Medication List as of 4/22/2019 11:35 PM      START taking these medications    Details   HYDROcodone-acetaminophen (NORCO) 5-325 mg per tablet Take 1 tablet by mouth every 8 (eight) hours as needed for Pain., Starting Mon 4/22/2019, Print      ondansetron (ZOFRAN-ODT) 4 MG TbDL Take 1 tablet (4 mg total) by mouth every 8 (eight) hours as needed., Starting Mon 4/22/2019, Print             Follow-up Information     [facility]    Specialty:  Emergency Medicine  Why:  As needed, If symptoms worsen  Contact information:  [address] [facility] [address] [phone]           Schedule an appointment as soon as possible for a visit  with PCP.           Schedule an appointment as soon as possible for a visit  with [facility].    Specialty:  Obstetrics and Gynecology  Contact information:  [address] [facility] [address] [phone]  Additional information:  (off [facility]) 3rd floor                       Medical Decision Making:   Clinical Tests:   Lab Tests: Reviewed and Ordered             Scribe Attestation:   Scribe #1: I performed the above scribed service and the documentation accurately describes the services I performed. I attest to the accuracy of the note.     Attending:   Physician Attestation Statement for Scribe #1: I, Tyler Garrett NP, personally performed the services described in this documentation, as scribed by Josselyn Chisholm, in my presence, and it is both accurate and complete.           Clinical Impression       ICD-10-CM ICD-9-CM   1. Acute right lower quadrant pain R10.31 789.03     338.19   2. Cysts of both ovaries N83.201 620.2    N83.202    3. Enlarged ovary N83.8 620.8             Tyler Garrett NP  04/24/19 0117

## 2021-04-18 ENCOUNTER — HOSPITAL ENCOUNTER (EMERGENCY)
Facility: HOSPITAL | Age: 26
Discharge: HOME | End: 2021-04-18
Attending: FAMILY MEDICINE
Payer: MEDICAID

## 2021-04-18 VITALS
DIASTOLIC BLOOD PRESSURE: 77 MMHG | OXYGEN SATURATION: 97 % | SYSTOLIC BLOOD PRESSURE: 129 MMHG | HEART RATE: 60 BPM | HEIGHT: 64 IN | BODY MASS INDEX: 44.3 KG/M2 | RESPIRATION RATE: 18 BRPM | TEMPERATURE: 98 F | WEIGHT: 259.5 LBS

## 2021-04-18 DIAGNOSIS — R07.9 CHEST PAIN: ICD-10-CM

## 2021-04-18 DIAGNOSIS — F41.9 ANXIETY: Primary | ICD-10-CM

## 2021-04-18 LAB
ALBUMIN SERPL BCP-MCNC: 3.8 G/DL (ref 3.5–5.2)
ALP SERPL-CCNC: 71 U/L (ref 55–135)
ALT SERPL W/O P-5'-P-CCNC: 19 U/L (ref 10–44)
AMPHET+METHAMPHET UR QL: NEGATIVE
ANION GAP SERPL CALC-SCNC: 9 MMOL/L (ref 8–16)
AST SERPL-CCNC: 18 U/L (ref 10–40)
B-HCG UR QL: NEGATIVE
BACTERIA #/AREA URNS HPF: NORMAL /HPF
BARBITURATES UR QL SCN>200 NG/ML: NEGATIVE
BASOPHILS # BLD AUTO: 0.03 K/UL (ref 0–0.2)
BASOPHILS NFR BLD: 0.4 % (ref 0–1.9)
BENZODIAZ UR QL SCN>200 NG/ML: NEGATIVE
BILIRUB SERPL-MCNC: 0.5 MG/DL (ref 0.1–1)
BILIRUB UR QL STRIP: NEGATIVE
BNP SERPL-MCNC: <10 PG/ML (ref 0–99)
BUN SERPL-MCNC: 10 MG/DL (ref 6–20)
BZE UR QL SCN: NEGATIVE
CALCIUM SERPL-MCNC: 8.6 MG/DL (ref 8.7–10.5)
CANNABINOIDS UR QL SCN: NORMAL
CHLORIDE SERPL-SCNC: 109 MMOL/L (ref 95–110)
CLARITY UR: CLEAR
CO2 SERPL-SCNC: 19 MMOL/L (ref 23–29)
COLOR UR: YELLOW
CREAT SERPL-MCNC: 0.8 MG/DL (ref 0.5–1.4)
CREAT UR-MCNC: 76.7 MG/DL (ref 15–325)
DIFFERENTIAL METHOD: ABNORMAL
EOSINOPHIL # BLD AUTO: 0.2 K/UL (ref 0–0.5)
EOSINOPHIL NFR BLD: 2.2 % (ref 0–8)
ERYTHROCYTE [DISTWIDTH] IN BLOOD BY AUTOMATED COUNT: 14 % (ref 11.5–14.5)
EST. GFR  (AFRICAN AMERICAN): >60 ML/MIN/1.73 M^2
EST. GFR  (NON AFRICAN AMERICAN): >60 ML/MIN/1.73 M^2
GLUCOSE SERPL-MCNC: 94 MG/DL (ref 70–110)
GLUCOSE UR QL STRIP: NEGATIVE
HCT VFR BLD AUTO: 40.9 % (ref 37–48.5)
HGB BLD-MCNC: 14.3 G/DL (ref 12–16)
HGB UR QL STRIP: NEGATIVE
IMM GRANULOCYTES # BLD AUTO: 0.04 K/UL (ref 0–0.04)
IMM GRANULOCYTES NFR BLD AUTO: 0.5 % (ref 0–0.5)
KETONES UR QL STRIP: NEGATIVE
LEUKOCYTE ESTERASE UR QL STRIP: ABNORMAL
LIPASE SERPL-CCNC: 38 U/L (ref 4–60)
LYMPHOCYTES # BLD AUTO: 3.3 K/UL (ref 1–4.8)
LYMPHOCYTES NFR BLD: 43.4 % (ref 18–48)
MCH RBC QN AUTO: 28.1 PG (ref 27–31)
MCHC RBC AUTO-ENTMCNC: 35 G/DL (ref 32–36)
MCV RBC AUTO: 80 FL (ref 82–98)
METHADONE UR QL SCN>300 NG/ML: NEGATIVE
MICROSCOPIC COMMENT: NORMAL
MONOCYTES # BLD AUTO: 0.6 K/UL (ref 0.3–1)
MONOCYTES NFR BLD: 7.6 % (ref 4–15)
NEUTROPHILS # BLD AUTO: 3.5 K/UL (ref 1.8–7.7)
NEUTROPHILS NFR BLD: 45.9 % (ref 38–73)
NITRITE UR QL STRIP: NEGATIVE
NRBC BLD-RTO: 0 /100 WBC
OPIATES UR QL SCN: NEGATIVE
PCP UR QL SCN>25 NG/ML: NEGATIVE
PH UR STRIP: 7 [PH] (ref 5–8)
PLATELET # BLD AUTO: 267 K/UL (ref 150–450)
PMV BLD AUTO: 10.4 FL (ref 9.2–12.9)
POTASSIUM SERPL-SCNC: 4.2 MMOL/L (ref 3.5–5.1)
PROT SERPL-MCNC: 7.4 G/DL (ref 6–8.4)
PROT UR QL STRIP: NEGATIVE
RBC # BLD AUTO: 5.09 M/UL (ref 4–5.4)
RBC #/AREA URNS HPF: 1 /HPF (ref 0–4)
SODIUM SERPL-SCNC: 137 MMOL/L (ref 136–145)
SP GR UR STRIP: 1.01 (ref 1–1.03)
TOXICOLOGY INFORMATION: NORMAL
TROPONIN I SERPL DL<=0.01 NG/ML-MCNC: 0.01 NG/ML (ref 0–0.03)
TROPONIN I SERPL DL<=0.01 NG/ML-MCNC: 0.02 NG/ML (ref 0–0.03)
TROPONIN I SERPL DL<=0.01 NG/ML-MCNC: 0.02 NG/ML (ref 0–0.03)
URN SPEC COLLECT METH UR: ABNORMAL
UROBILINOGEN UR STRIP-ACNC: NEGATIVE EU/DL
WBC # BLD AUTO: 7.68 K/UL (ref 3.9–12.7)
WBC #/AREA URNS HPF: 2 /HPF (ref 0–5)

## 2021-04-18 PROCEDURE — 96374 THER/PROPH/DIAG INJ IV PUSH: CPT

## 2021-04-18 PROCEDURE — 63600175 PHARM REV CODE 636 W HCPCS: Performed by: FAMILY MEDICINE

## 2021-04-18 PROCEDURE — 85025 COMPLETE CBC W/AUTO DIFF WBC: CPT | Performed by: FAMILY MEDICINE

## 2021-04-18 PROCEDURE — 99285 EMERGENCY DEPT VISIT HI MDM: CPT | Mod: 25

## 2021-04-18 PROCEDURE — 93010 EKG 12-LEAD: ICD-10-PCS | Mod: ,,, | Performed by: INTERNAL MEDICINE

## 2021-04-18 PROCEDURE — 25000003 PHARM REV CODE 250: Performed by: FAMILY MEDICINE

## 2021-04-18 PROCEDURE — 83690 ASSAY OF LIPASE: CPT | Performed by: FAMILY MEDICINE

## 2021-04-18 PROCEDURE — 81025 URINE PREGNANCY TEST: CPT | Performed by: FAMILY MEDICINE

## 2021-04-18 PROCEDURE — 93005 ELECTROCARDIOGRAM TRACING: CPT

## 2021-04-18 PROCEDURE — 84484 ASSAY OF TROPONIN QUANT: CPT | Mod: 91 | Performed by: FAMILY MEDICINE

## 2021-04-18 PROCEDURE — 80307 DRUG TEST PRSMV CHEM ANLYZR: CPT | Performed by: FAMILY MEDICINE

## 2021-04-18 PROCEDURE — 80053 COMPREHEN METABOLIC PANEL: CPT | Performed by: FAMILY MEDICINE

## 2021-04-18 PROCEDURE — 81000 URINALYSIS NONAUTO W/SCOPE: CPT | Mod: 59 | Performed by: FAMILY MEDICINE

## 2021-04-18 PROCEDURE — 93010 ELECTROCARDIOGRAM REPORT: CPT | Mod: ,,, | Performed by: INTERNAL MEDICINE

## 2021-04-18 PROCEDURE — 83880 ASSAY OF NATRIURETIC PEPTIDE: CPT | Performed by: FAMILY MEDICINE

## 2021-04-18 RX ORDER — LORAZEPAM 2 MG/ML
0.5 INJECTION INTRAMUSCULAR
Status: COMPLETED | OUTPATIENT
Start: 2021-04-18 | End: 2021-04-18

## 2021-04-18 RX ORDER — ASPIRIN 325 MG
325 TABLET ORAL
Status: COMPLETED | OUTPATIENT
Start: 2021-04-18 | End: 2021-04-18

## 2021-04-18 RX ORDER — ALPRAZOLAM 0.25 MG/1
0.25 TABLET ORAL 2 TIMES DAILY PRN
Qty: 10 TABLET | Refills: 0 | Status: SHIPPED | OUTPATIENT
Start: 2021-04-18

## 2021-04-18 RX ADMIN — ASPIRIN 325 MG ORAL TABLET 325 MG: 325 PILL ORAL at 04:04

## 2021-04-18 RX ADMIN — LORAZEPAM 0.5 MG: 2 INJECTION INTRAMUSCULAR; INTRAVENOUS at 04:04

## 2022-10-03 ENCOUNTER — HOSPITAL ENCOUNTER (EMERGENCY)
Facility: HOSPITAL | Age: 27
Discharge: HOME | End: 2022-10-03
Attending: EMERGENCY MEDICINE
Payer: MEDICAID

## 2022-10-03 VITALS
WEIGHT: 263.13 LBS | RESPIRATION RATE: 14 BRPM | SYSTOLIC BLOOD PRESSURE: 142 MMHG | HEIGHT: 64 IN | HEART RATE: 83 BPM | DIASTOLIC BLOOD PRESSURE: 73 MMHG | OXYGEN SATURATION: 98 % | TEMPERATURE: 98 F | BODY MASS INDEX: 44.92 KG/M2

## 2022-10-03 DIAGNOSIS — G56.01 CARPAL TUNNEL SYNDROME OF RIGHT WRIST: ICD-10-CM

## 2022-10-03 DIAGNOSIS — R10.30 LOWER ABDOMINAL PAIN: Primary | ICD-10-CM

## 2022-10-03 LAB
ALBUMIN SERPL BCP-MCNC: 3.7 G/DL (ref 3.5–5.2)
ALP SERPL-CCNC: 70 U/L (ref 55–135)
ALT SERPL W/O P-5'-P-CCNC: 16 U/L (ref 10–44)
ANION GAP SERPL CALC-SCNC: 10 MMOL/L (ref 8–16)
AST SERPL-CCNC: 15 U/L (ref 10–40)
BASOPHILS # BLD AUTO: 0.03 K/UL (ref 0–0.2)
BASOPHILS NFR BLD: 0.3 % (ref 0–1.9)
BILIRUB SERPL-MCNC: 0.4 MG/DL (ref 0.1–1)
BILIRUB UR QL STRIP: NEGATIVE
BUN SERPL-MCNC: 11 MG/DL (ref 6–20)
CALCIUM SERPL-MCNC: 9.1 MG/DL (ref 8.7–10.5)
CHLORIDE SERPL-SCNC: 109 MMOL/L (ref 95–110)
CLARITY UR: ABNORMAL
CO2 SERPL-SCNC: 21 MMOL/L (ref 23–29)
COLOR UR: ABNORMAL
CREAT SERPL-MCNC: 0.9 MG/DL (ref 0.5–1.4)
DIFFERENTIAL METHOD: ABNORMAL
EOSINOPHIL # BLD AUTO: 0.2 K/UL (ref 0–0.5)
EOSINOPHIL NFR BLD: 2.1 % (ref 0–8)
ERYTHROCYTE [DISTWIDTH] IN BLOOD BY AUTOMATED COUNT: 14.4 % (ref 11.5–14.5)
EST. GFR  (NO RACE VARIABLE): >60 ML/MIN/1.73 M^2
GLUCOSE SERPL-MCNC: 110 MG/DL (ref 70–110)
GLUCOSE UR QL STRIP: NEGATIVE
HCG INTACT+B SERPL-ACNC: <1.2 MIU/ML
HCT VFR BLD AUTO: 37.3 % (ref 37–48.5)
HGB BLD-MCNC: 13.2 G/DL (ref 12–16)
HGB UR QL STRIP: ABNORMAL
IMM GRANULOCYTES # BLD AUTO: 0.03 K/UL (ref 0–0.04)
IMM GRANULOCYTES NFR BLD AUTO: 0.3 % (ref 0–0.5)
KETONES UR QL STRIP: NEGATIVE
LEUKOCYTE ESTERASE UR QL STRIP: NEGATIVE
LYMPHOCYTES # BLD AUTO: 2.2 K/UL (ref 1–4.8)
LYMPHOCYTES NFR BLD: 24.3 % (ref 18–48)
MCH RBC QN AUTO: 27.9 PG (ref 27–31)
MCHC RBC AUTO-ENTMCNC: 35.4 G/DL (ref 32–36)
MCV RBC AUTO: 79 FL (ref 82–98)
MICROSCOPIC COMMENT: ABNORMAL
MONOCYTES # BLD AUTO: 0.6 K/UL (ref 0.3–1)
MONOCYTES NFR BLD: 6.1 % (ref 4–15)
NEUTROPHILS # BLD AUTO: 6.1 K/UL (ref 1.8–7.7)
NEUTROPHILS NFR BLD: 66.9 % (ref 38–73)
NITRITE UR QL STRIP: NEGATIVE
NRBC BLD-RTO: 0 /100 WBC
PH UR STRIP: 6 [PH] (ref 5–8)
PLATELET # BLD AUTO: 341 K/UL (ref 150–450)
PMV BLD AUTO: 9.6 FL (ref 9.2–12.9)
POTASSIUM SERPL-SCNC: 4 MMOL/L (ref 3.5–5.1)
PROT SERPL-MCNC: 7.2 G/DL (ref 6–8.4)
PROT UR QL STRIP: ABNORMAL
RBC # BLD AUTO: 4.73 M/UL (ref 4–5.4)
RBC #/AREA URNS HPF: >100 /HPF (ref 0–4)
SODIUM SERPL-SCNC: 140 MMOL/L (ref 136–145)
SP GR UR STRIP: 1.02 (ref 1–1.03)
SQUAMOUS #/AREA URNS HPF: 1 /HPF
URN SPEC COLLECT METH UR: ABNORMAL
UROBILINOGEN UR STRIP-ACNC: NEGATIVE EU/DL
WBC # BLD AUTO: 9.11 K/UL (ref 3.9–12.7)

## 2022-10-03 PROCEDURE — 80053 COMPREHEN METABOLIC PANEL: CPT | Performed by: EMERGENCY MEDICINE

## 2022-10-03 PROCEDURE — 85025 COMPLETE CBC W/AUTO DIFF WBC: CPT | Performed by: EMERGENCY MEDICINE

## 2022-10-03 PROCEDURE — 81000 URINALYSIS NONAUTO W/SCOPE: CPT | Performed by: EMERGENCY MEDICINE

## 2022-10-03 PROCEDURE — 99284 EMERGENCY DEPT VISIT MOD MDM: CPT | Mod: 25

## 2022-10-03 PROCEDURE — 84702 CHORIONIC GONADOTROPIN TEST: CPT | Performed by: EMERGENCY MEDICINE

## 2022-10-03 PROCEDURE — 63600175 PHARM REV CODE 636 W HCPCS: Performed by: EMERGENCY MEDICINE

## 2022-10-03 PROCEDURE — 96374 THER/PROPH/DIAG INJ IV PUSH: CPT

## 2022-10-03 RX ORDER — NAPROXEN 375 MG/1
375 TABLET ORAL 2 TIMES DAILY WITH MEALS
Qty: 14 TABLET | Refills: 0 | Status: SHIPPED | OUTPATIENT
Start: 2022-10-03

## 2022-10-03 RX ORDER — KETOROLAC TROMETHAMINE 30 MG/ML
15 INJECTION, SOLUTION INTRAMUSCULAR; INTRAVENOUS
Status: COMPLETED | OUTPATIENT
Start: 2022-10-03 | End: 2022-10-03

## 2022-10-03 RX ADMIN — KETOROLAC TROMETHAMINE 15 MG: 30 INJECTION, SOLUTION INTRAMUSCULAR; INTRAVENOUS at 04:10

## 2022-10-03 NOTE — ED PROVIDER NOTES
SCRIBE #1 NOTE: I, Virgil Smith, am scribing for, and in the presence of, Abril Ortiz MD. I have scribed the entire note.       History     Chief Complaint   Patient presents with    Abdominal Pain     C/o R arm pain and vomiting. States also having abd pain and vag bleeding. Advises cycle ended 2 weeks ago.        Review of patient's allergies indicates:   Allergen Reactions    Iodine and iodide containing products Anaphylaxis         History of Present Illness     HPI    10/3/2022, 2:35 AM  History obtained from the patient      History of Present Illness: Jina Mills is a 27 y.o. female patient who presents to the Emergency Department for evaluation of abdominal pain which onset gradually yesterday. Pt is c/o lower abdominal pain with vaginal bleeding. She reports that her cycle ended 2 weeks ago, but she notices blood whenever she wipes after using the bathroom. Pt is also complaining a burning pain in her R arm that worsens when picking things up. This pain onset approximately 2 weeks ago. Symptoms are constant and moderate in severity. Associated sxs include nausea and vomiting. Patient denies any diarrhea, fever, chills, cough, sore throat, vaginal discharge, and all other sxs at this time. No further complaints or concerns at this time.       Arrival mode: Personal vehicle    PCP: Josselin Fuentes PA-C        Past Medical History:  No past medical history on file.    Past Surgical History:  No past surgical history on file.      Family History:  Family History   Problem Relation Age of Onset    No Known Problems Mother     No Known Problems Father     No Known Problems Sister     No Known Problems Brother     No Known Problems Maternal Aunt     No Known Problems Maternal Uncle     No Known Problems Paternal Aunt     No Known Problems Paternal Uncle     No Known Problems Maternal Grandmother     No Known Problems Maternal Grandfather     No Known Problems Paternal Grandmother     No Known Problems  Paternal Grandfather     Amblyopia Neg Hx     Blindness Neg Hx     Cancer Neg Hx     Cataracts Neg Hx     Diabetes Neg Hx     Glaucoma Neg Hx     Hypertension Neg Hx     Macular degeneration Neg Hx     Retinal detachment Neg Hx     Strabismus Neg Hx     Stroke Neg Hx     Thyroid disease Neg Hx        Social History:  Social History     Tobacco Use    Smoking status: Every Day     Types: Cigarettes    Smokeless tobacco: Not on file   Substance and Sexual Activity    Alcohol use: No    Drug use: No    Sexual activity: Not on file        Review of Systems     Review of Systems   Constitutional:  Negative for chills and fever.   HENT:  Negative for sore throat.    Respiratory:  Negative for shortness of breath.    Cardiovascular:  Negative for chest pain.   Gastrointestinal:  Positive for abdominal pain, nausea and vomiting. Negative for diarrhea.   Genitourinary:  Positive for vaginal bleeding. Negative for dysuria and vaginal discharge.   Musculoskeletal:  Positive for myalgias (R arm). Negative for back pain.   Skin:  Negative for rash.   Neurological:  Negative for weakness.   Hematological:  Does not bruise/bleed easily.   All other systems reviewed and are negative.     Physical Exam     Initial Vitals [10/03/22 0207]   BP Pulse Resp Temp SpO2   (!) 142/73 83 14 98.3 °F (36.8 °C) 98 %      MAP       --          Physical Exam  Nursing Notes and Vital Signs Reviewed.  Constitutional: Patient is in no acute distress. Well-developed and well-nourished.  Head: Atraumatic. Normocephalic.  Eyes: PERRL. EOM intact. Conjunctivae are not pale. No scleral icterus.  ENT: Mucous membranes are moist. Oropharynx is clear and symmetric.    Neck: Supple. Full ROM. No lymphadenopathy.  Cardiovascular: Regular rate. Regular rhythm. No murmurs, rubs, or gallops. Distal pulses are 2+ and symmetric.  Pulmonary/Chest: No respiratory distress. Clear to auscultation bilaterally. No wheezing or rales.  Abdominal: Soft and non-distended.   "Lower abdominal tenderness.  No rebound, guarding, or rigidity. Good bowel sounds.  Genitourinary: No CVA tenderness  Musculoskeletal: Moves all extremities. No obvious deformities. No edema. No calf tenderness.  Skin: Warm and dry.  Neurological:  Alert, awake, and appropriate.  Normal speech.  No acute focal neurological deficits are appreciated.  Psychiatric: Normal affect. Good eye contact. Appropriate in content.     ED Course   Procedures  ED Vital Signs:  Vitals:    10/03/22 0207   BP: (!) 142/73   Pulse: 83   Resp: 14   Temp: 98.3 °F (36.8 °C)   TempSrc: Oral   SpO2: 98%   Weight: 119.3 kg (263 lb 1.9 oz)   Height: 5' 4" (1.626 m)       Abnormal Lab Results:  Labs Reviewed   CBC W/ AUTO DIFFERENTIAL - Abnormal; Notable for the following components:       Result Value    MCV 79 (*)     All other components within normal limits    Narrative:     Release to patient->Immediate   COMPREHENSIVE METABOLIC PANEL - Abnormal; Notable for the following components:    CO2 21 (*)     All other components within normal limits    Narrative:     Release to patient->Immediate   URINALYSIS, REFLEX TO URINE CULTURE - Abnormal; Notable for the following components:    Color, UA Orange (*)     Appearance, UA Hazy (*)     Protein, UA Trace (*)     Occult Blood UA 3+ (*)     All other components within normal limits    Narrative:     Specimen Source->Urine   URINALYSIS MICROSCOPIC - Abnormal; Notable for the following components:    RBC, UA >100 (*)     All other components within normal limits    Narrative:     Specimen Source->Urine   HCG, QUANTITATIVE    Narrative:     Release to patient->Immediate        All Lab Results:  Results for orders placed or performed during the hospital encounter of 10/03/22   CBC auto differential   Result Value Ref Range    WBC 9.11 3.90 - 12.70 K/uL    RBC 4.73 4.00 - 5.40 M/uL    Hemoglobin 13.2 12.0 - 16.0 g/dL    Hematocrit 37.3 37.0 - 48.5 %    MCV 79 (L) 82 - 98 fL    MCH 27.9 27.0 - 31.0 pg "    MCHC 35.4 32.0 - 36.0 g/dL    RDW 14.4 11.5 - 14.5 %    Platelets 341 150 - 450 K/uL    MPV 9.6 9.2 - 12.9 fL    Immature Granulocytes 0.3 0.0 - 0.5 %    Gran # (ANC) 6.1 1.8 - 7.7 K/uL    Immature Grans (Abs) 0.03 0.00 - 0.04 K/uL    Lymph # 2.2 1.0 - 4.8 K/uL    Mono # 0.6 0.3 - 1.0 K/uL    Eos # 0.2 0.0 - 0.5 K/uL    Baso # 0.03 0.00 - 0.20 K/uL    nRBC 0 0 /100 WBC    Gran % 66.9 38.0 - 73.0 %    Lymph % 24.3 18.0 - 48.0 %    Mono % 6.1 4.0 - 15.0 %    Eosinophil % 2.1 0.0 - 8.0 %    Basophil % 0.3 0.0 - 1.9 %    Differential Method Automated    Comprehensive metabolic panel   Result Value Ref Range    Sodium 140 136 - 145 mmol/L    Potassium 4.0 3.5 - 5.1 mmol/L    Chloride 109 95 - 110 mmol/L    CO2 21 (L) 23 - 29 mmol/L    Glucose 110 70 - 110 mg/dL    BUN 11 6 - 20 mg/dL    Creatinine 0.9 0.5 - 1.4 mg/dL    Calcium 9.1 8.7 - 10.5 mg/dL    Total Protein 7.2 6.0 - 8.4 g/dL    Albumin 3.7 3.5 - 5.2 g/dL    Total Bilirubin 0.4 0.1 - 1.0 mg/dL    Alkaline Phosphatase 70 55 - 135 U/L    AST 15 10 - 40 U/L    ALT 16 10 - 44 U/L    Anion Gap 10 8 - 16 mmol/L    eGFR >60 >60 mL/min/1.73 m^2   Urinalysis, Reflex to Urine Culture Urine, Clean Catch    Specimen: Urine   Result Value Ref Range    Specimen UA Urine, Clean Catch     Color, UA Orange (A) Yellow, Straw, Aspen    Appearance, UA Hazy (A) Clear    pH, UA 6.0 5.0 - 8.0    Specific Gravity, UA 1.020 1.005 - 1.030    Protein, UA Trace (A) Negative    Glucose, UA Negative Negative    Ketones, UA Negative Negative    Bilirubin (UA) Negative Negative    Occult Blood UA 3+ (A) Negative    Nitrite, UA Negative Negative    Urobilinogen, UA Negative <2.0 EU/dL    Leukocytes, UA Negative Negative   hCG, quantitative, pregnancy   Result Value Ref Range    HCG Quant <1.2 See Text mIU/mL   Urinalysis Microscopic   Result Value Ref Range    RBC, UA >100 (H) 0 - 4 /hpf    Squam Epithel, UA 1 /hpf    Microscopic Comment SEE COMMENT          Imaging Results:  Imaging Results     None                   The Emergency Provider reviewed the vital signs and test results, which are outlined above.     ED Discussion       4:33 AM: Reassessed pt at this time.  Discussed with pt all pertinent ED information and results. Discussed pt dx and plan of tx. Gave pt all f/u and return to the ED instructions. All questions and concerns were addressed at this time. Pt expresses understanding of information and instructions, and is comfortable with plan to discharge. Pt is stable for discharge.    I discussed with patient and/or family/caretaker that evaluation in the ED does not suggest any emergent or life threatening medical conditions requiring immediate intervention beyond what was provided in the ED, and I believe patient is safe for discharge.  Regardless, an unremarkable evaluation in the ED does not preclude the development or presence of a serious of life threatening condition. As such, patient was instructed to return immediately for any worsening or change in current symptoms.         Medical Decision Making:   Clinical Tests:   Lab Tests: Ordered and Reviewed         ED Medication(s):  Medications   ketorolac injection 15 mg (15 mg Intravenous Given 10/3/22 0452)       Discharge Medication List as of 10/3/2022  4:35 AM        START taking these medications    Details   naproxen (NAPROSYN) 375 MG tablet Take 1 tablet (375 mg total) by mouth 2 (two) times daily with meals., Starting Mon 10/3/2022, Print              Follow-up Information       [facility] In 2 days.    Specialty: Obstetrics and Gynecology  Contact information:  [address] [facility] [address] [phone]             [facility] In 3 days.    Specialty: Orthopedics  Contact information:  [address] [facility] [id number] [phone]             [facility]    Specialty: Emergency Medicine  Why: As needed, If symptoms worsen  Contact information:  [address]  [facility] [address]  [phone]                               Scribe Attestation:   Scribe #1: I performed the above scribed service and the documentation accurately describes the services I performed. I attest to the accuracy of the note.     Attending:   Physician Attestation Statement for Scribe #1: I, Abril Ortiz MD, personally performed the services described in this documentation, as scribed by Virgil Smith, in my presence, and it is both accurate and complete.           Clinical Impression       ICD-10-CM ICD-9-CM   1. Lower abdominal pain  R10.30 789.09   2. Carpal tunnel syndrome of right wrist  G56.01 354.0       Disposition:   Disposition: Discharged  Condition: Stable       Abril Ortiz MD  10/03/22 0511

## 2022-10-03 NOTE — Clinical Note
"Jina Boswella" Lina was seen and treated in our emergency department on 10/3/2022.  She may return to work on 10/04/2022.       If you have any questions or concerns, please don't hesitate to call.      reginaldo becerra RN    "

## 2023-10-15 ENCOUNTER — HOSPITAL ENCOUNTER (EMERGENCY)
Facility: HOSPITAL | Age: 28
Discharge: HOME | End: 2023-10-15
Attending: EMERGENCY MEDICINE
Payer: MEDICAID

## 2023-10-15 VITALS
OXYGEN SATURATION: 100 % | RESPIRATION RATE: 18 BRPM | TEMPERATURE: 99 F | HEART RATE: 69 BPM | SYSTOLIC BLOOD PRESSURE: 109 MMHG | DIASTOLIC BLOOD PRESSURE: 63 MMHG | BODY MASS INDEX: 44.07 KG/M2 | WEIGHT: 256.75 LBS

## 2023-10-15 DIAGNOSIS — R19.7 NAUSEA VOMITING AND DIARRHEA: Primary | ICD-10-CM

## 2023-10-15 DIAGNOSIS — R61 DIAPHORESIS: ICD-10-CM

## 2023-10-15 DIAGNOSIS — R11.2 NAUSEA VOMITING AND DIARRHEA: Primary | ICD-10-CM

## 2023-10-15 LAB
ALBUMIN SERPL BCP-MCNC: 3.8 G/DL (ref 3.5–5.2)
ALP SERPL-CCNC: 80 U/L (ref 55–135)
ALT SERPL W/O P-5'-P-CCNC: 18 U/L (ref 10–44)
ANION GAP SERPL CALC-SCNC: 12 MMOL/L (ref 8–16)
AST SERPL-CCNC: 17 U/L (ref 10–40)
BASOPHILS # BLD AUTO: 0.02 K/UL (ref 0–0.2)
BASOPHILS NFR BLD: 0.2 % (ref 0–1.9)
BILIRUB SERPL-MCNC: 0.5 MG/DL (ref 0.1–1)
BILIRUB UR QL STRIP: NEGATIVE
BUN SERPL-MCNC: 9 MG/DL (ref 6–20)
CALCIUM SERPL-MCNC: 9 MG/DL (ref 8.7–10.5)
CHLORIDE SERPL-SCNC: 111 MMOL/L (ref 95–110)
CLARITY UR: CLEAR
CO2 SERPL-SCNC: 19 MMOL/L (ref 23–29)
COLOR UR: YELLOW
CREAT SERPL-MCNC: 0.9 MG/DL (ref 0.5–1.4)
DIFFERENTIAL METHOD: ABNORMAL
EOSINOPHIL # BLD AUTO: 0 K/UL (ref 0–0.5)
EOSINOPHIL NFR BLD: 0.1 % (ref 0–8)
ERYTHROCYTE [DISTWIDTH] IN BLOOD BY AUTOMATED COUNT: 14.6 % (ref 11.5–14.5)
EST. GFR  (NO RACE VARIABLE): >60 ML/MIN/1.73 M^2
GLUCOSE SERPL-MCNC: 117 MG/DL (ref 70–110)
GLUCOSE UR QL STRIP: NEGATIVE
HCT VFR BLD AUTO: 39.2 % (ref 37–48.5)
HCV AB SERPL QL IA: NEGATIVE
HEP C VIRUS HOLD SPECIMEN: NORMAL
HGB BLD-MCNC: 14.1 G/DL (ref 12–16)
HGB UR QL STRIP: NEGATIVE
HIV 1+2 AB+HIV1 P24 AG SERPL QL IA: NEGATIVE
IMM GRANULOCYTES # BLD AUTO: 0.05 K/UL (ref 0–0.04)
IMM GRANULOCYTES NFR BLD AUTO: 0.4 % (ref 0–0.5)
KETONES UR QL STRIP: NEGATIVE
LEUKOCYTE ESTERASE UR QL STRIP: NEGATIVE
LIPASE SERPL-CCNC: 18 U/L (ref 4–60)
LYMPHOCYTES # BLD AUTO: 1.3 K/UL (ref 1–4.8)
LYMPHOCYTES NFR BLD: 10.5 % (ref 18–48)
MCH RBC QN AUTO: 28.5 PG (ref 27–31)
MCHC RBC AUTO-ENTMCNC: 36 G/DL (ref 32–36)
MCV RBC AUTO: 79 FL (ref 82–98)
MONOCYTES # BLD AUTO: 0.3 K/UL (ref 0.3–1)
MONOCYTES NFR BLD: 2.3 % (ref 4–15)
NEUTROPHILS # BLD AUTO: 10.6 K/UL (ref 1.8–7.7)
NEUTROPHILS NFR BLD: 86.5 % (ref 38–73)
NITRITE UR QL STRIP: NEGATIVE
NRBC BLD-RTO: 0 /100 WBC
PH UR STRIP: >8 [PH] (ref 5–8)
PLATELET # BLD AUTO: 322 K/UL (ref 150–450)
PMV BLD AUTO: 10 FL (ref 9.2–12.9)
POTASSIUM SERPL-SCNC: 3.9 MMOL/L (ref 3.5–5.1)
PROT SERPL-MCNC: 7.4 G/DL (ref 6–8.4)
PROT UR QL STRIP: ABNORMAL
RBC # BLD AUTO: 4.95 M/UL (ref 4–5.4)
SODIUM SERPL-SCNC: 142 MMOL/L (ref 136–145)
SP GR UR STRIP: 1.02 (ref 1–1.03)
TROPONIN I SERPL DL<=0.01 NG/ML-MCNC: 0.01 NG/ML (ref 0–0.03)
URN SPEC COLLECT METH UR: ABNORMAL
UROBILINOGEN UR STRIP-ACNC: NEGATIVE EU/DL
WBC # BLD AUTO: 12.22 K/UL (ref 3.9–12.7)

## 2023-10-15 PROCEDURE — 84484 ASSAY OF TROPONIN QUANT: CPT | Performed by: EMERGENCY MEDICINE

## 2023-10-15 PROCEDURE — 83690 ASSAY OF LIPASE: CPT | Performed by: EMERGENCY MEDICINE

## 2023-10-15 PROCEDURE — 93005 ELECTROCARDIOGRAM TRACING: CPT

## 2023-10-15 PROCEDURE — 63600175 PHARM REV CODE 636 W HCPCS: Performed by: EMERGENCY MEDICINE

## 2023-10-15 PROCEDURE — 81003 URINALYSIS AUTO W/O SCOPE: CPT | Performed by: EMERGENCY MEDICINE

## 2023-10-15 PROCEDURE — 93010 ELECTROCARDIOGRAM REPORT: CPT | Mod: ,,, | Performed by: INTERNAL MEDICINE

## 2023-10-15 PROCEDURE — 80053 COMPREHEN METABOLIC PANEL: CPT | Performed by: EMERGENCY MEDICINE

## 2023-10-15 PROCEDURE — 85025 COMPLETE CBC W/AUTO DIFF WBC: CPT | Performed by: EMERGENCY MEDICINE

## 2023-10-15 PROCEDURE — 86803 HEPATITIS C AB TEST: CPT | Performed by: EMERGENCY MEDICINE

## 2023-10-15 PROCEDURE — 93010 EKG 12-LEAD: ICD-10-PCS | Mod: ,,, | Performed by: INTERNAL MEDICINE

## 2023-10-15 PROCEDURE — 96361 HYDRATE IV INFUSION ADD-ON: CPT

## 2023-10-15 PROCEDURE — 96374 THER/PROPH/DIAG INJ IV PUSH: CPT

## 2023-10-15 PROCEDURE — 99285 EMERGENCY DEPT VISIT HI MDM: CPT | Mod: 25

## 2023-10-15 PROCEDURE — 87389 HIV-1 AG W/HIV-1&-2 AB AG IA: CPT | Performed by: EMERGENCY MEDICINE

## 2023-10-15 PROCEDURE — 25000003 PHARM REV CODE 250: Performed by: EMERGENCY MEDICINE

## 2023-10-15 PROCEDURE — 96375 TX/PRO/DX INJ NEW DRUG ADDON: CPT

## 2023-10-15 PROCEDURE — 96376 TX/PRO/DX INJ SAME DRUG ADON: CPT

## 2023-10-15 RX ORDER — MORPHINE SULFATE 4 MG/ML
4 INJECTION, SOLUTION INTRAMUSCULAR; INTRAVENOUS
Status: DISCONTINUED | OUTPATIENT
Start: 2023-10-15 | End: 2023-10-15

## 2023-10-15 RX ORDER — ONDANSETRON 4 MG/1
4 TABLET, ORALLY DISINTEGRATING ORAL EVERY 8 HOURS PRN
Qty: 10 TABLET | Refills: 0 | Status: SHIPPED | OUTPATIENT
Start: 2023-10-15

## 2023-10-15 RX ORDER — ONDANSETRON 2 MG/ML
4 INJECTION INTRAMUSCULAR; INTRAVENOUS
Status: COMPLETED | OUTPATIENT
Start: 2023-10-15 | End: 2023-10-15

## 2023-10-15 RX ORDER — MORPHINE SULFATE 4 MG/ML
4 INJECTION, SOLUTION INTRAMUSCULAR; INTRAVENOUS
Status: COMPLETED | OUTPATIENT
Start: 2023-10-15 | End: 2023-10-15

## 2023-10-15 RX ORDER — METOCLOPRAMIDE HYDROCHLORIDE 5 MG/ML
10 INJECTION INTRAMUSCULAR; INTRAVENOUS
Status: COMPLETED | OUTPATIENT
Start: 2023-10-15 | End: 2023-10-15

## 2023-10-15 RX ADMIN — SODIUM CHLORIDE 1000 ML: 9 INJECTION, SOLUTION INTRAVENOUS at 04:10

## 2023-10-15 RX ADMIN — ONDANSETRON 4 MG: 2 INJECTION INTRAMUSCULAR; INTRAVENOUS at 04:10

## 2023-10-15 RX ADMIN — MORPHINE SULFATE 4 MG: 4 INJECTION INTRAVENOUS at 04:10

## 2023-10-15 RX ADMIN — METOCLOPRAMIDE 10 MG: 5 INJECTION, SOLUTION INTRAMUSCULAR; INTRAVENOUS at 07:10

## 2023-10-15 NOTE — ED PROVIDER NOTES
SCRIBE #1 NOTE: I, Heather Castellanos, am scribing for, and in the presence of, Kelsi Lyons DO. I have scribed the HPI, ROS, and PEx.     SCRIBE #2 NOTE: I, Chris Jamison, am scribing for, and in the presence of,  Michael Leyva MD. I have scribed the remaining portions of the note not scribed by Scribe #1.      History     Chief Complaint   Patient presents with    Abdominal Pain     C/o abd pain that started yesterday. Also c/o n/v/d.      Review of patient's allergies indicates:   Allergen Reactions    Iodine and iodide containing products Anaphylaxis         History of Present Illness     HPI    10/15/2023, 3:29 AM  History obtained from the patient      History of Present Illness: Jina Mills is a 28 y.o. female patient who presents to the Emergency Department for evaluation of abdominal pain which onset 1 day PTA. Symptoms are constant and severe. No mitigating or exacerbating factors reported. Associated sxs include n/v/d, subjective fever and chills. Patient denies any dysuria, hematuria, vaginal bleeding, vaginal discharge, and all other sxs at this time. Surgical history includes hysterectomy with bowel and bladder reconstruction. No further complaints or concerns at this time.       Arrival mode: Personal vehicle      PCP: Josselin Fuentes PA-C        Past Medical History:  No past medical history on file.    Past Surgical History:  No past surgical history on file.      Family History:  Family History   Problem Relation Age of Onset    No Known Problems Mother     No Known Problems Father     No Known Problems Sister     No Known Problems Brother     No Known Problems Maternal Aunt     No Known Problems Maternal Uncle     No Known Problems Paternal Aunt     No Known Problems Paternal Uncle     No Known Problems Maternal Grandmother     No Known Problems Maternal Grandfather     No Known Problems Paternal Grandmother     No Known Problems Paternal Grandfather     Amblyopia Neg Hx      Blindness Neg Hx     Cancer Neg Hx     Cataracts Neg Hx     Diabetes Neg Hx     Glaucoma Neg Hx     Hypertension Neg Hx     Macular degeneration Neg Hx     Retinal detachment Neg Hx     Strabismus Neg Hx     Stroke Neg Hx     Thyroid disease Neg Hx        Social History:  Social History     Tobacco Use    Smoking status: Every Day     Types: Cigarettes    Smokeless tobacco: Not on file   Substance and Sexual Activity    Alcohol use: No    Drug use: No    Sexual activity: Not on file        Review of Systems     Review of Systems   Constitutional:  Positive for chills and fever (subjective).   Gastrointestinal:  Positive for abdominal pain, diarrhea, nausea and vomiting.   Genitourinary:  Negative for dysuria, hematuria, vaginal bleeding and vaginal discharge.      Physical Exam     Initial Vitals [10/15/23 0301]   BP Pulse Resp Temp SpO2   (!) 150/79 74 16 98.9 °F (37.2 °C) 100 %      MAP       --          Physical Exam     Nursing Notes and Vital Signs Reviewed.  Constitutional: Patient is in mild distress. Well-developed and well-nourished.  Head: Atraumatic. Normocephalic.  Eyes: PERRL. EOM intact. Conjunctivae are not pale. No scleral icterus.  ENT: Mucous membranes are moist. Oropharynx is clear and symmetric .    Neck: Supple. Full ROM. No lymphadenopathy.  Cardiovascular: Regular rate. Regular rhythm. No murmurs, rubs, or gallops. Distal pulses are 2+ and symmetric.  Pulmonary/Chest: No respiratory distress. Clear to auscultation bilaterally. No wheezing or rales.  Abdominal: Soft and non-distended.  There is lower tenderness to palpation. Lower abdominal scar from previous surgery.  No rebound, guarding, or rigidity. Good bowel sounds.  Genitourinary: No CVA tenderness  Musculoskeletal: Moves all extremities. No obvious deformities. No edema. No calf tenderness.  Skin: Warm and dry.  Neurological:  Alert, awake, and appropriate.  Normal speech.  No acute focal neurological deficits are  appreciated.  Psychiatric: Normal affect. Good eye contact. Appropriate in content.     ED Course   Procedures  ED Vital Signs:  Vitals:    10/15/23 0301 10/15/23 0420 10/15/23 0628   BP: (!) 150/79  136/75   Pulse: 74  69   Resp: 16 18 18   Temp: 98.9 °F (37.2 °C)  98.5 °F (36.9 °C)   TempSrc: Oral     SpO2: 100%  100%   Weight: 116.4 kg (256 lb 11.6 oz)         Abnormal Lab Results:  Labs Reviewed   CBC W/ AUTO DIFFERENTIAL - Abnormal; Notable for the following components:       Result Value    MCV 79 (*)     RDW 14.6 (*)     Gran # (ANC) 10.6 (*)     Immature Grans (Abs) 0.05 (*)     Gran % 86.5 (*)     Lymph % 10.5 (*)     Mono % 2.3 (*)     All other components within normal limits    Narrative:     Release to patient->Immediate   COMPREHENSIVE METABOLIC PANEL - Abnormal; Notable for the following components:    Chloride 111 (*)     CO2 19 (*)     Glucose 117 (*)     All other components within normal limits    Narrative:     Release to patient->Immediate   URINALYSIS, REFLEX TO URINE CULTURE - Abnormal; Notable for the following components:    pH, UA >8.0 (*)     Protein, UA Trace (*)     All other components within normal limits    Narrative:     Specimen Source->Urine   HIV 1 / 2 ANTIBODY    Narrative:     Release to patient->Immediate   HEPATITIS C ANTIBODY    Narrative:     Release to patient->Immediate   HEP C VIRUS HOLD SPECIMEN    Narrative:     Release to patient->Immediate   LIPASE    Narrative:     Release to patient->Immediate   TROPONIN I   TROPONIN I    Narrative:     Release to patient->Immediate        All Lab Results:  Results for orders placed or performed during the hospital encounter of 10/15/23   HIV 1/2 Ag/Ab (4th Gen)   Result Value Ref Range    HIV 1/2 Ag/Ab Negative Negative   Hepatitis C Antibody   Result Value Ref Range    Hepatitis C Ab Negative Negative   HCV Virus Hold Specimen   Result Value Ref Range    HEP C Virus Hold Specimen Hold for HCV sendout    CBC W/ AUTO DIFFERENTIAL    Result Value Ref Range    WBC 12.22 3.90 - 12.70 K/uL    RBC 4.95 4.00 - 5.40 M/uL    Hemoglobin 14.1 12.0 - 16.0 g/dL    Hematocrit 39.2 37.0 - 48.5 %    MCV 79 (L) 82 - 98 fL    MCH 28.5 27.0 - 31.0 pg    MCHC 36.0 32.0 - 36.0 g/dL    RDW 14.6 (H) 11.5 - 14.5 %    Platelets 322 150 - 450 K/uL    MPV 10.0 9.2 - 12.9 fL    Immature Granulocytes 0.4 0.0 - 0.5 %    Gran # (ANC) 10.6 (H) 1.8 - 7.7 K/uL    Immature Grans (Abs) 0.05 (H) 0.00 - 0.04 K/uL    Lymph # 1.3 1.0 - 4.8 K/uL    Mono # 0.3 0.3 - 1.0 K/uL    Eos # 0.0 0.0 - 0.5 K/uL    Baso # 0.02 0.00 - 0.20 K/uL    nRBC 0 0 /100 WBC    Gran % 86.5 (H) 38.0 - 73.0 %    Lymph % 10.5 (L) 18.0 - 48.0 %    Mono % 2.3 (L) 4.0 - 15.0 %    Eosinophil % 0.1 0.0 - 8.0 %    Basophil % 0.2 0.0 - 1.9 %    Differential Method Automated    Comp. Metabolic Panel   Result Value Ref Range    Sodium 142 136 - 145 mmol/L    Potassium 3.9 3.5 - 5.1 mmol/L    Chloride 111 (H) 95 - 110 mmol/L    CO2 19 (L) 23 - 29 mmol/L    Glucose 117 (H) 70 - 110 mg/dL    BUN 9 6 - 20 mg/dL    Creatinine 0.9 0.5 - 1.4 mg/dL    Calcium 9.0 8.7 - 10.5 mg/dL    Total Protein 7.4 6.0 - 8.4 g/dL    Albumin 3.8 3.5 - 5.2 g/dL    Total Bilirubin 0.5 0.1 - 1.0 mg/dL    Alkaline Phosphatase 80 55 - 135 U/L    AST 17 10 - 40 U/L    ALT 18 10 - 44 U/L    eGFR >60 >60 mL/min/1.73 m^2    Anion Gap 12 8 - 16 mmol/L   Lipase   Result Value Ref Range    Lipase 18 4 - 60 U/L   Urinalysis, Reflex to Urine Culture Urine, Clean Catch    Specimen: Urine   Result Value Ref Range    Specimen UA Urine, Clean Catch     Color, UA Yellow Yellow, Straw, Aspen    Appearance, UA Clear Clear    pH, UA >8.0 (A) 5.0 - 8.0    Specific Gravity, UA 1.020 1.005 - 1.030    Protein, UA Trace (A) Negative    Glucose, UA Negative Negative    Ketones, UA Negative Negative    Bilirubin (UA) Negative Negative    Occult Blood UA Negative Negative    Nitrite, UA Negative Negative    Urobilinogen, UA Negative <2.0 EU/dL    Leukocytes, UA  Negative Negative   Troponin I   Result Value Ref Range    Troponin I 0.007 0.000 - 0.026 ng/mL         Imaging Results:  Imaging Results              CT Abdomen Pelvis  Without Contrast (Final result)  Result time 10/15/23 05:43:37      Final result by Segundo Cruz MD (10/15/23 05:43:37)                   Impression:      1.  There is no CT evidence of an acute intra-abdominal inflammatory process.  Normal appendix.  2.  No bowel obstruction.  3.  Status post surgical resection previously seen pelvic cystic mass.  No evidence of recurrence.  Hysterectomy.      All CT scans at [this location] are performed using dose modulation techniques as appropriate to a performed exam including the following:  Automated exposure control; adjustment of the mA and/or kV according to patient size (this includes techniques or standardized protocols for targeted exams where dose is matched to indication / reason for exam; i.e. extremities or head); use of iterative reconstruction technique.    Finalized on: 10/15/2023 5:43 AM By:  Segundo Cruz MD  [facility]# [id number]      2023-10-15 05:45:47.230    [facility]               Narrative:    EXAM:  CT ABDOMEN PELVIS WITHOUT CONTRAST    CLINICAL INDICATION: Generalized abdominal pain.  Bowel obstruction.    TECHNIQUE: Abdominal and pelvic CT without contrast. Coronal and sagittal reformations.    COMPARISON STUDY:   12/16/2022.    FINDINGS:    Abdominal CT.  Lung bases are clear.    Liver, spleen, pancreas, adrenal glands, and kidneys are normal.    Normal caliber aorta.  No lymphadenopathy.  Normal gallbladder.  No biliary ductal dilatation.    No bowel obstruction.  The appendix is normal.  There are couple sigmoid diverticula.  No diverticulitis.  The GI tract is otherwise unremarkable.    The bones are unremarkable.    Pelvic CT.  The pelvic ring is intact.    Status post hysterectomy.  Multiple pelvic surgical clips.  The previously seen pelvic cystic mass has been surgically resected.  No  evidence of recurrence.    Pelvic bowel loops are otherwise unremarkable.  The ureters and urinary bladder are normal.  There is no pelvic mass or free fluid, or lymphadenopathy.    Midline lower ventral abdominal wall scar.                                           The EKG was ordered, reviewed, and independently interpreted by the ED provider.  Interpretation time: 05:53  Rate: 58 BPM  Rhythm: Sinus bradycardia with sinus arrhythmia  Interpretation: No acute ST changes. No STEMI.             The Emergency Provider reviewed the vital signs and test results, which are outlined above.     ED Discussion     5:50 AM: Re-evaluated pt. Pt states she is having nausea and breaking into cold sweats.  D/w pt all pertinent results. Will order an EKG.  D/w pt any concerns expressed at this time. Answered all questions. Pt expresses understanding at this time.     6:00 AM: Dr. Lyons transfers care of patient to Dr. Leyva pending lab and EKG results.     8:29 AM: Reassessed pt at this time.  Discussed with pt all pertinent ED information and results. Discussed pt dx and plan of tx. Gave pt all f/u and return to the ED instructions. All questions and concerns were addressed at this time. Pt expresses understanding of information and instructions, and is comfortable with plan to discharge. Pt is stable for discharge.    I discussed with patient and/or family/caretaker that evaluation in the ED does not suggest any emergent or life threatening medical conditions requiring immediate intervention beyond what was provided in the ED, and I believe patient is safe for discharge.  Regardless, an unremarkable evaluation in the ED does not preclude the development or presence of a serious of life threatening condition. As such, patient was instructed to return immediately for any worsening or change in current symptoms.     ED Course as of 10/15/23 0833   Sun Oct 15, 2023   0424 Allergic to iodine and unable to tolerate oral contrast i.e.  "vomiting after drinking [NF]      ED Course User Index  [NF] Kelsi Lyons, DO     Medical Decision Making  Nausea/vomiting/diarrhea with abdominal pain.  Initially evaluated by another ED provider with care handed off to me with workup pending.  Workup unremarkable.  Patient treated in the emergency department with pain medication and antiemetics lung with IV fluids.  On my examination, patient reported feeling back to baseline.  She commented "I think it was just a 24 hour bug." Patient is tolerating p.o. and stable for discharge at this time.  P.r.n. prescription for Zofran provided shared her nausea/vomiting returned.  ER return precautions provided    Amount and/or Complexity of Data Reviewed  External Data Reviewed: notes.     Details: Past medical history reviewed.  Allergy list reviewed.  Labs: ordered. Decision-making details documented in ED Course.  Radiology: ordered. Decision-making details documented in ED Course.    Risk  Prescription drug management.  Parenteral controlled substances.                ED Medication(s):  Medications   sodium chloride 0.9% bolus 1,000 mL 1,000 mL (1,000 mLs Intravenous New Bag 10/15/23 0419)   ondansetron injection 4 mg (4 mg Intravenous Given 10/15/23 0420)   morphine injection 4 mg (4 mg Intravenous Given 10/15/23 0420)   metoclopramide HCl injection 10 mg (10 mg Intravenous Given 10/15/23 0716)       New Prescriptions    ONDANSETRON (ZOFRAN-ODT) 4 MG TBDL    Take 1 tablet (4 mg total) by mouth every 8 (eight) hours as needed.        Follow-up Information       Call  Josselin Fuentes PA-C.    Specialty: Internal Medicine  Contact information:  [address] [facility] [address] [phone]               [facility] - Emergency Dept..    Specialty: Emergency Medicine  Why: As needed, If symptoms worsen  Contact information:  [address]  [facility] [address] [phone]                               Scribe Attestation:   Scribe #1: I " performed the above scribed service and the documentation accurately describes the services I performed. I attest to the accuracy of the note.     Attending:   Physician Attestation Statement for Scribe #1: I, Kelsi Lyons DO, personally performed the services described in this documentation, as scribed by Heather Castellanos, in my presence, and it is both accurate and complete.       Scribe Attestation:   Scribe #2: I performed the above scribed service and the documentation accurately describes the services I performed. I attest to the accuracy of the note.    Attending Attestation:           Physician Attestation for Scribe:    Physician Attestation Statement for Scribe #2: I, Michael Leyva MD, reviewed documentation, as scribed by Chris Jamison in my presence, and it is both accurate and complete. I also acknowledge and confirm the content of the note done by Scribe #1.           Clinical Impression       ICD-10-CM ICD-9-CM   1. Nausea vomiting and diarrhea  R11.2 787.91    R19.7 787.01   2. Diaphoresis  R61 780.8       Disposition:   Disposition: Discharged  Condition: Stable         Michael Leyva MD  10/15/23 0834